# Patient Record
Sex: MALE | Race: WHITE | NOT HISPANIC OR LATINO
[De-identification: names, ages, dates, MRNs, and addresses within clinical notes are randomized per-mention and may not be internally consistent; named-entity substitution may affect disease eponyms.]

---

## 2017-03-21 ENCOUNTER — APPOINTMENT (OUTPATIENT)
Dept: CARDIOLOGY | Facility: CLINIC | Age: 47
End: 2017-03-21

## 2017-05-23 ENCOUNTER — APPOINTMENT (OUTPATIENT)
Dept: CARDIOLOGY | Facility: CLINIC | Age: 47
End: 2017-05-23

## 2017-07-18 ENCOUNTER — APPOINTMENT (OUTPATIENT)
Dept: CARDIOLOGY | Facility: CLINIC | Age: 47
End: 2017-07-18

## 2017-11-20 ENCOUNTER — RX RENEWAL (OUTPATIENT)
Age: 47
End: 2017-11-20

## 2017-12-12 ENCOUNTER — APPOINTMENT (OUTPATIENT)
Dept: CARDIOLOGY | Facility: CLINIC | Age: 47
End: 2017-12-12
Payer: COMMERCIAL

## 2017-12-12 VITALS
HEART RATE: 57 BPM | WEIGHT: 200 LBS | HEIGHT: 72 IN | OXYGEN SATURATION: 98 % | SYSTOLIC BLOOD PRESSURE: 146 MMHG | DIASTOLIC BLOOD PRESSURE: 94 MMHG | BODY MASS INDEX: 27.09 KG/M2

## 2017-12-12 DIAGNOSIS — Z82.49 FAMILY HISTORY OF ISCHEMIC HEART DISEASE AND OTHER DISEASES OF THE CIRCULATORY SYSTEM: ICD-10-CM

## 2017-12-12 DIAGNOSIS — Z80.6 FAMILY HISTORY OF LEUKEMIA: ICD-10-CM

## 2017-12-12 DIAGNOSIS — Z83.3 FAMILY HISTORY OF DIABETES MELLITUS: ICD-10-CM

## 2017-12-12 DIAGNOSIS — Z78.9 OTHER SPECIFIED HEALTH STATUS: ICD-10-CM

## 2017-12-12 PROCEDURE — 99213 OFFICE O/P EST LOW 20 MIN: CPT

## 2017-12-12 RX ORDER — LOSARTAN POTASSIUM 50 MG/1
50 TABLET, FILM COATED ORAL
Qty: 30 | Refills: 0 | Status: DISCONTINUED | COMMUNITY
Start: 2017-11-20 | End: 2017-12-12

## 2017-12-12 RX ORDER — MULTIVIT-MIN/FA/LYCOPEN/LUTEIN .4-300-25
TABLET ORAL
Refills: 0 | Status: ACTIVE | COMMUNITY

## 2018-01-23 ENCOUNTER — APPOINTMENT (OUTPATIENT)
Dept: CARDIOLOGY | Facility: CLINIC | Age: 48
End: 2018-01-23
Payer: COMMERCIAL

## 2018-01-23 VITALS
SYSTOLIC BLOOD PRESSURE: 140 MMHG | WEIGHT: 198 LBS | OXYGEN SATURATION: 96 % | HEART RATE: 59 BPM | BODY MASS INDEX: 26.82 KG/M2 | DIASTOLIC BLOOD PRESSURE: 84 MMHG | HEIGHT: 72 IN

## 2018-01-23 PROCEDURE — 99213 OFFICE O/P EST LOW 20 MIN: CPT

## 2018-03-01 ENCOUNTER — RECORD ABSTRACTING (OUTPATIENT)
Age: 48
End: 2018-03-01

## 2018-03-06 ENCOUNTER — APPOINTMENT (OUTPATIENT)
Dept: CARDIOLOGY | Facility: CLINIC | Age: 48
End: 2018-03-06
Payer: COMMERCIAL

## 2018-03-06 VITALS
BODY MASS INDEX: 26.82 KG/M2 | SYSTOLIC BLOOD PRESSURE: 130 MMHG | HEIGHT: 72 IN | HEART RATE: 62 BPM | WEIGHT: 198 LBS | DIASTOLIC BLOOD PRESSURE: 78 MMHG | OXYGEN SATURATION: 98 %

## 2018-03-06 PROCEDURE — 99214 OFFICE O/P EST MOD 30 MIN: CPT

## 2018-08-20 ENCOUNTER — MEDICATION RENEWAL (OUTPATIENT)
Age: 48
End: 2018-08-20

## 2018-08-20 ENCOUNTER — RX RENEWAL (OUTPATIENT)
Age: 48
End: 2018-08-20

## 2018-09-11 ENCOUNTER — APPOINTMENT (OUTPATIENT)
Dept: CARDIOLOGY | Facility: CLINIC | Age: 48
End: 2018-09-11
Payer: COMMERCIAL

## 2018-09-11 ENCOUNTER — NON-APPOINTMENT (OUTPATIENT)
Age: 48
End: 2018-09-11

## 2018-09-11 VITALS
DIASTOLIC BLOOD PRESSURE: 80 MMHG | HEIGHT: 72 IN | WEIGHT: 200 LBS | SYSTOLIC BLOOD PRESSURE: 130 MMHG | BODY MASS INDEX: 27.09 KG/M2 | HEART RATE: 58 BPM

## 2018-09-11 PROCEDURE — 93000 ELECTROCARDIOGRAM COMPLETE: CPT

## 2018-09-11 PROCEDURE — 99213 OFFICE O/P EST LOW 20 MIN: CPT

## 2018-09-11 RX ORDER — LOSARTAN POTASSIUM 100 MG/1
100 TABLET, FILM COATED ORAL DAILY
Qty: 30 | Refills: 11 | Status: DISCONTINUED | COMMUNITY
Start: 2017-12-12 | End: 2018-09-11

## 2019-03-19 ENCOUNTER — APPOINTMENT (OUTPATIENT)
Dept: CARDIOLOGY | Facility: CLINIC | Age: 49
End: 2019-03-19
Payer: COMMERCIAL

## 2019-03-19 VITALS
WEIGHT: 203 LBS | OXYGEN SATURATION: 97 % | HEART RATE: 60 BPM | SYSTOLIC BLOOD PRESSURE: 130 MMHG | DIASTOLIC BLOOD PRESSURE: 88 MMHG | BODY MASS INDEX: 27.53 KG/M2

## 2019-03-19 PROCEDURE — 99213 OFFICE O/P EST LOW 20 MIN: CPT

## 2019-03-19 NOTE — REASON FOR VISIT
[Follow-Up - Clinic] : a clinic follow-up of [FreeTextEntry2] : Blood pressure evaluation, follow up sleep study and labs. [FreeTextEntry1] : 49 year old active white male with family history of HTN and personal hx of HTN, YARITZA on treatment, asymptomatic sinus bradycardia.\par \par He was last seen September, 2018.  Home BP 130s/80s on Losartan 50MG bid and  chlorthalidone 25mg QD.\par \par He continues to exercise regularly with weight lifting and performing cardiovascular exercise.   He added more cardio- running 2.5miles 2 -3 x per week, lifts weights 5x week. There are no exertional symptoms. \par \par He eats very "clean", lots of lean protein, water and follows low carb diet.  He avoids salt.\par \par Sleep study 2/22/18 reveals severe sleep apnea.  CPAP titration study completed and now appropriately treated, with success. Fatigue has improved significantly.  \par \par Avoidance of ETOH, respiratory and CNS suppressant medications should be avoided.  No significant arrhythmia- PVCs.\par \par He denies any chest pain, dyspnea, palpitations, dizziness, syncope or edema.\par \par History of H/As in past- evaluated by neurologists and improved.\par \par Echocardiogram September 2016 revealed EF of 55%, normal left ventricular systolic function, there was trace valvular regurgitation, with normal aortic dimensions.\par \par Exercise tolerance testing September of 2016 was negative for ischemia.\par \par He works [employer].  [employer].  Has annual physical with JENNIFER CONNORS.\par \[facility] EKG 2-5-19 SB with 1st degree AVB.\par  \[facility] Labs:\par \par -2-19 Normal CBC, CMP with Cr. 0.96, BUN 16, 143, K 3.7, normal LFT's, total chol 174, Trgis 68, HDL 56, , \par -6/21/18 , K 3.4, , CO2 32, BUN 28, CREAT 0.93, P 4.0, CA 9.1, CHOL 187, , HDL 57, TRIGS 53, AST 42, ALT 37, HGB 13.9, HCT 41.7, WBC 5.0, .  U/A- NO protein.\par \[facility] -8/24/18: , K 3.7, CL 97, BUN21, CR 1.04, CALCIUM 9.8\par -2/13/18: , K 4.0, CL 95, CO2 32, BUN 23, CR 0.96, GFR 94, CA 9.8,\par      PT was feeling fatigued last visit- Testosterone free and total 594 ( range 264-916)\par                                                            Free testosterone 11.4 (range 6.8-21.5)\par \par

## 2019-03-19 NOTE — PHYSICAL EXAM
[Normal Appearance] : normal appearance [General Appearance - Well Developed] : well developed [General Appearance - Well Nourished] : well nourished [Well Groomed] : well groomed [No Deformities] : no deformities [Normal Conjunctiva] : the conjunctiva exhibited no abnormalities [General Appearance - In No Acute Distress] : no acute distress [Normal Oral Mucosa] : normal oral mucosa [Eyelids - No Xanthelasma] : the eyelids demonstrated no xanthelasmas [No Oral Pallor] : no oral pallor [No Oral Cyanosis] : no oral cyanosis [No Jugular Venous Arndt A Waves] : no jugular venous arndt A waves [Normal Jugular Venous V Waves Present] : normal jugular venous V waves present [Normal Jugular Venous A Waves Present] : normal jugular venous A waves present [Respiration, Rhythm And Depth] : normal respiratory rhythm and effort [Exaggerated Use Of Accessory Muscles For Inspiration] : no accessory muscle use [Auscultation Breath Sounds / Voice Sounds] : lungs were clear to auscultation bilaterally [Heart Rate And Rhythm] : heart rate and rhythm were normal [Heart Sounds] : normal S1 and S2 [Murmurs] : no murmurs present [Abdomen Soft] : soft [Abdomen Tenderness] : non-tender [Abnormal Walk] : normal gait [Abdomen Mass (___ Cm)] : no abdominal mass palpated [Gait - Sufficient For Exercise Testing] : the gait was sufficient for exercise testing [Nail Clubbing] : no clubbing of the fingernails [Petechial Hemorrhages (___cm)] : no petechial hemorrhages [Cyanosis, Localized] : no localized cyanosis [] : no rash [Skin Color & Pigmentation] : normal skin color and pigmentation [No Venous Stasis] : no venous stasis [Skin Lesions] : no skin lesions [No Skin Ulcers] : no skin ulcer [No Xanthoma] : no  xanthoma was observed [Oriented To Time, Place, And Person] : oriented to person, place, and time [No Anxiety] : not feeling anxious [Mood] : the mood was normal [Affect] : the affect was normal

## 2019-03-19 NOTE — ASSESSMENT
[FreeTextEntry1] : 49 year old very active male with past medical hx of mild dyslipidemia, well controlled with lifestyle change and HTN, presently asymptomatic.\par \par Remain on  Losartan 50mg po bid and chlorthalildone 25mg po qd.  No change.  \par \par - Prior cardiac testing were negative. \par \par -Severe YARITZA CPAP titration compliant.\par \par -Continue to Monitor home BPs\par -Continue with healthy active lifestyle. \par \par He should follow up in 1 year or sooner if needed.  Follow up with PMD for on going medical care.

## 2019-07-23 ENCOUNTER — MEDICATION RENEWAL (OUTPATIENT)
Age: 49
End: 2019-07-23

## 2019-07-23 ENCOUNTER — APPOINTMENT (OUTPATIENT)
Dept: CARDIOLOGY | Facility: CLINIC | Age: 49
End: 2019-07-23
Payer: COMMERCIAL

## 2019-07-23 VITALS
WEIGHT: 200 LBS | SYSTOLIC BLOOD PRESSURE: 140 MMHG | DIASTOLIC BLOOD PRESSURE: 80 MMHG | HEART RATE: 59 BPM | OXYGEN SATURATION: 98 % | HEIGHT: 72 IN | BODY MASS INDEX: 27.09 KG/M2

## 2019-07-23 DIAGNOSIS — Z86.39 PERSONAL HISTORY OF OTHER ENDOCRINE, NUTRITIONAL AND METABOLIC DISEASE: ICD-10-CM

## 2019-07-23 PROCEDURE — 99214 OFFICE O/P EST MOD 30 MIN: CPT

## 2019-07-23 NOTE — ASSESSMENT
[FreeTextEntry1] : 49 year old very active male with past medical hx of mild dyslipidemia, well controlled with lifestyle change and HTN, presently asymptomatic.\par \par Remains on  Losartan 50mg po bid and chlorthalildone 25mg po qd. Sometimes his blood pressures are above 140. At his job, we want it to be lower than 140 systolic. Add low-dose Norvasc.\par \par - Prior cardiac testing was negative. Followup echocardiogram in future. He will call for results.\par \par -Severe YARITZA CPAP titration compliant.\par \par -Continue to Monitor home BPs\par \par -Continue with healthy active lifestyle. \par \par The patient was not taking potassium supplements. It has been prescribed. He is on diuretic and requires oral potassium tablet.\par \par Abnormal LFT. He declines alcohol abuse. He takes alcohol on few occasions in a month. I have asked him to follow his abnormal LFTs with Dr. Pfeiffer. He is asymptomatic.\par \par \par Sincerely,\par Hayden Stiles MD, FACC, MIMI

## 2019-07-23 NOTE — PHYSICAL EXAM
[General Appearance - Well Developed] : well developed [Normal Appearance] : normal appearance [General Appearance - Well Nourished] : well nourished [Well Groomed] : well groomed [No Deformities] : no deformities [Normal Conjunctiva] : the conjunctiva exhibited no abnormalities [General Appearance - In No Acute Distress] : no acute distress [Eyelids - No Xanthelasma] : the eyelids demonstrated no xanthelasmas [Normal Oral Mucosa] : normal oral mucosa [No Oral Pallor] : no oral pallor [No Oral Cyanosis] : no oral cyanosis [Normal Jugular Venous A Waves Present] : normal jugular venous A waves present [Normal Jugular Venous V Waves Present] : normal jugular venous V waves present [No Jugular Venous Arndt A Waves] : no jugular venous arndt A waves [Respiration, Rhythm And Depth] : normal respiratory rhythm and effort [Exaggerated Use Of Accessory Muscles For Inspiration] : no accessory muscle use [Auscultation Breath Sounds / Voice Sounds] : lungs were clear to auscultation bilaterally [Heart Rate And Rhythm] : heart rate and rhythm were normal [Heart Sounds] : normal S1 and S2 [Murmurs] : no murmurs present [Abdomen Soft] : soft [Abdomen Tenderness] : non-tender [Abdomen Mass (___ Cm)] : no abdominal mass palpated [Gait - Sufficient For Exercise Testing] : the gait was sufficient for exercise testing [Abnormal Walk] : normal gait [Nail Clubbing] : no clubbing of the fingernails [Petechial Hemorrhages (___cm)] : no petechial hemorrhages [Cyanosis, Localized] : no localized cyanosis [] : no ischemic changes [Skin Color & Pigmentation] : normal skin color and pigmentation [Skin Lesions] : no skin lesions [No Venous Stasis] : no venous stasis [No Skin Ulcers] : no skin ulcer [No Xanthoma] : no  xanthoma was observed [Oriented To Time, Place, And Person] : oriented to person, place, and time [Mood] : the mood was normal [Affect] : the affect was normal [No Anxiety] : not feeling anxious

## 2019-07-23 NOTE — REASON FOR VISIT
[Follow-Up - Clinic] : a clinic follow-up of [FreeTextEntry2] : Blood pressure evaluation, follow up sleep study and labs. [FreeTextEntry1] : 49 year old active white male with family history of HTN and personal hx of HTN, YARITZA on treatment, asymptomatic sinus bradycardia.\par \par Home BP 140s/80s on Losartan 50MG bid and  chlorthalidone 25mg QD.\par \par He continues to exercise regularly with weight lifting and performing cardiovascular exercise.   He added more cardio- running 2.5miles 2 -3 x per week, lifts weights 5x week. There are no exertional symptoms. \par \par He eats very "clean", lots of lean protein, water and follows low carb diet.  He avoids salt.\par \par Sleep study 2/22/18 reveals severe sleep apnea.  CPAP titration study completed and now appropriately treated, with success. Fatigue has improved significantly.  \par \par Avoidance of ETOH, respiratory and CNS suppressant medications should be avoided.  No significant arrhythmia- PVCs.\par \par He denies any chest pain, dyspnea, palpitations, dizziness, syncope or edema.\par \par History of H/As in past- evaluated by neurologists and improved.\par \par Echocardiogram September 2016 revealed EF of 55%, normal left ventricular systolic function, there was trace valvular regurgitation, with normal aortic dimensions.\par \par Exercise tolerance testing September of 2016 was negative for ischemia.\par \par He works [employer].  [employer].  Has annual physical with FRony CONNORS.\par \par EKG 2-5-19 SB with 1st degree AVB.\par  \par Labs:\par \par -2-19 Normal CBC, CMP with Cr. 0.96, BUN 16, 143, K 3.7, normal LFT's, total chol 174, Trgis 68, HDL 56, , \par -6/21/18 , K 3.4, , CO2 32, BUN 28, CREAT 0.93, P 4.0, CA 9.1, CHOL 187, , HDL 57, TRIGS 53, AST 42, ALT 37, HGB 13.9, HCT 41.7, WBC 5.0, .  U/A- NO protein.\par \par -

## 2019-10-30 ENCOUNTER — APPOINTMENT (OUTPATIENT)
Dept: CARDIOLOGY | Facility: CLINIC | Age: 49
End: 2019-10-30
Payer: COMMERCIAL

## 2019-10-30 PROCEDURE — 93306 TTE W/DOPPLER COMPLETE: CPT

## 2019-11-05 ENCOUNTER — APPOINTMENT (OUTPATIENT)
Dept: CARDIOLOGY | Facility: CLINIC | Age: 49
End: 2019-11-05

## 2019-11-20 ENCOUNTER — MEDICATION RENEWAL (OUTPATIENT)
Age: 49
End: 2019-11-20

## 2020-02-04 ENCOUNTER — APPOINTMENT (OUTPATIENT)
Dept: CARDIOLOGY | Facility: CLINIC | Age: 50
End: 2020-02-04
Payer: COMMERCIAL

## 2020-02-04 VITALS
HEIGHT: 72 IN | HEART RATE: 71 BPM | DIASTOLIC BLOOD PRESSURE: 84 MMHG | SYSTOLIC BLOOD PRESSURE: 128 MMHG | OXYGEN SATURATION: 95 % | WEIGHT: 206 LBS | BODY MASS INDEX: 27.9 KG/M2

## 2020-02-04 PROCEDURE — 99214 OFFICE O/P EST MOD 30 MIN: CPT

## 2020-02-04 RX ORDER — MAGNESIUM OXIDE 500 MG
500 TABLET ORAL
Refills: 0 | Status: DISCONTINUED | COMMUNITY
End: 2020-02-04

## 2020-02-04 NOTE — ASSESSMENT
[FreeTextEntry1] : Reviewed today:\par -Echocardiogram September 2016 revealed EF of 55%, normal left ventricular systolic function, there was trace valvular regurgitation, with normal aortic dimensions.\par \par Exercise tolerance testing September of 2016 was negative for ischemia.\par \par EKG 2-5-19 SB with 1st degree AVB.\par  \par Labs: 2-19 Normal CBC, CMP with Cr. 0.96, BUN 16, 143, K 3.7, normal LFT's, total chol 174, Trgis 68, HDL 56, \par \par Labs June 2019: , potassium 3.3, AST 55, ALT 65\par \par \par

## 2020-02-04 NOTE — REASON FOR VISIT
[Follow-Up - Clinic] : a clinic follow-up of [FreeTextEntry2] : Blood pressure evaluation, follow up sleep study and labs. [FreeTextEntry1] : 49 year old white male with family history of HTN and personal hx of HTN, YARITZA on treatment, asymptomatic sinus bradycardia.\par \par Home /80s on Losartan 50MG bid and  chlorthalidone 25mg QD and Norvasc\par \par He continues to exercise regularly with weight lifting and performing cardiovascular exercise.   He added more cardio- running 2.5miles 2 -3 x per week, lifts weights 5x week. There are no exertional symptoms. \par \par He eats very "clean", lots of lean protein, water and follows low carb diet.  He avoids salt.\par \par Sleep study 2/22/18 reveals severe sleep apnea.  CPAP titration study completed and now appropriately treated, with success. Fatigue has improved significantly.  \par \par He denies any chest pain, dyspnea, palpitations, dizziness, syncope or edema. History of H/As in past- evaluated by neurologists and improved.\par \par He works [employer].  [employer].  Has annual physical with FRony CONNORS.\par \par

## 2020-02-04 NOTE — DISCUSSION/SUMMARY
[FreeTextEntry1] : \par 49 year old very active male with past medical hx of mild dyslipidemia, well controlled with lifestyle change and HTN, presently asymptomatic.\par \par Remains on  Losartan 50mg po bid and chlorthalildone 25mg po qd and low-dose Norvasc.  Blood pressures are well controlled.  His home blood pressure monitor is fairly close to office readings\par \par Severe YARITZA -on CPAP mask treatment with good results.\par \par Continue to Monitor home BPs\par \par Continue with healthy active lifestyle.  ETT in future.  On medications to gauge blood pressure response and possible arrhythmias.  Echocardiogram from October 2019 was discussed.  No LVH and normal left atrium and normal diastolic function\par \par Abnormal LFT. He declines alcohol abuse.  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  Currently, he does not take potassium or magnesium tablet but takes them through supplements from [facility].  He understands the needs for the supplements on diuretics.  He does not have any cramps or symptoms of electrolyte disturbance\par \par \par Sincerely,\par Hayden Stiles MD, FACC, MIMI

## 2020-02-04 NOTE — PHYSICAL EXAM
[Normal Appearance] : normal appearance [General Appearance - Well Developed] : well developed [Well Groomed] : well groomed [General Appearance - In No Acute Distress] : no acute distress [General Appearance - Well Nourished] : well nourished [No Deformities] : no deformities [Normal Conjunctiva] : the conjunctiva exhibited no abnormalities [Eyelids - No Xanthelasma] : the eyelids demonstrated no xanthelasmas [No Oral Pallor] : no oral pallor [No Oral Cyanosis] : no oral cyanosis [Normal Oral Mucosa] : normal oral mucosa [Normal Jugular Venous A Waves Present] : normal jugular venous A waves present [No Jugular Venous Arndt A Waves] : no jugular venous arndt A waves [Normal Jugular Venous V Waves Present] : normal jugular venous V waves present [Respiration, Rhythm And Depth] : normal respiratory rhythm and effort [Exaggerated Use Of Accessory Muscles For Inspiration] : no accessory muscle use [Heart Sounds] : normal S1 and S2 [Auscultation Breath Sounds / Voice Sounds] : lungs were clear to auscultation bilaterally [Heart Rate And Rhythm] : heart rate and rhythm were normal [Murmurs] : no murmurs present [Abdomen Soft] : soft [Abdomen Mass (___ Cm)] : no abdominal mass palpated [Abdomen Tenderness] : non-tender [Abnormal Walk] : normal gait [Nail Clubbing] : no clubbing of the fingernails [Gait - Sufficient For Exercise Testing] : the gait was sufficient for exercise testing [Skin Color & Pigmentation] : normal skin color and pigmentation [Petechial Hemorrhages (___cm)] : no petechial hemorrhages [Cyanosis, Localized] : no localized cyanosis [No Venous Stasis] : no venous stasis [] : no rash [Skin Lesions] : no skin lesions [No Skin Ulcers] : no skin ulcer [No Xanthoma] : no  xanthoma was observed [Oriented To Time, Place, And Person] : oriented to person, place, and time [Affect] : the affect was normal [Mood] : the mood was normal [No Anxiety] : not feeling anxious

## 2020-10-21 ENCOUNTER — APPOINTMENT (OUTPATIENT)
Dept: CARDIOLOGY | Facility: CLINIC | Age: 50
End: 2020-10-21
Payer: COMMERCIAL

## 2020-10-21 PROCEDURE — 93015 CV STRESS TEST SUPVJ I&R: CPT

## 2020-10-27 ENCOUNTER — APPOINTMENT (OUTPATIENT)
Dept: CARDIOLOGY | Facility: CLINIC | Age: 50
End: 2020-10-27
Payer: COMMERCIAL

## 2020-10-27 VITALS
WEIGHT: 193 LBS | OXYGEN SATURATION: 98 % | DIASTOLIC BLOOD PRESSURE: 86 MMHG | HEIGHT: 72 IN | HEART RATE: 59 BPM | TEMPERATURE: 98.1 F | SYSTOLIC BLOOD PRESSURE: 130 MMHG | BODY MASS INDEX: 26.14 KG/M2

## 2020-10-27 PROCEDURE — 99072 ADDL SUPL MATRL&STAF TM PHE: CPT

## 2020-10-27 PROCEDURE — 99214 OFFICE O/P EST MOD 30 MIN: CPT

## 2020-10-27 NOTE — ASSESSMENT
[FreeTextEntry1] : Reviewed today:\par -Echocardiogram September 2016 revealed EF of 55%, normal left ventricular systolic function, there was trace valvular regurgitation, with normal aortic dimensions.\par \par Exercise tolerance testing September of 2016 was negative for ischemia.\par \par EKG 2-5-19 SB with 1st degree AVB.\par  \par Labs: 2-19 Normal CBC, CMP with Cr. 0.96, BUN 16, 143, K 3.7, normal LFT's, total chol 174, Trgis 68, HDL 56, \par \par Labs June 2019: , potassium 3.3, AST 55, ALT 65\par \par Labs April 2020: Creatinine 1.16.  Potassium 3.8.\par \par ETT October 2020: Exercise 15: 44 minutes on James protocol.  Peak blood pressure 160/100.  Occasional PVCs.  No ischemia or angina.\par \par \par

## 2020-10-27 NOTE — DISCUSSION/SUMMARY
[FreeTextEntry1] : \par 50 year old very active male with past medical hx of mild dyslipidemia, well controlled with lifestyle change and HTN, presently asymptomatic.\par \par Remains on  Losartan 50mg po bid and chlorthalildone 25mg po qd and low-dose Norvasc.  Blood pressures are well controlled.  His home blood pressure monitor is fairly close to office readings\par \par Severe YARITZA -on CPAP mask treatment with good results.\par \par Continue to Monitor home BPs\par \par Continue with healthy active lifestyle.  Echocardiogram from October 2019 was discussed.  No LVH and normal left atrium and normal diastolic function\par \par Currently, he does not take potassium or magnesium tablet but takes them through supplements from [facility].  He understands the needs for the supplements on diuretics.  He does not have any cramps or symptoms of electrolyte disturbance\par \par History of abnormal LFT. He declines alcohol abuse.  LFTs had come down on labs in September 2019\par \par \par Sincerely,\par Hayden Stiles MD, FACC, MIMI

## 2020-10-27 NOTE — REASON FOR VISIT
[Follow-Up - Clinic] : a clinic follow-up of [FreeTextEntry2] : Blood pressure evaluation, follow up sleep study and labs. [FreeTextEntry1] : 50 year old white male with family history of HTN and personal hx of HTN, YARITZA on treatment, asymptomatic sinus bradycardia.\par \par Home /80s on Losartan 50MG bid and  chlorthalidone 25mg QD and Norvasc\par \par He continues to exercise regularly with weight lifting and performing cardiovascular exercise.   There are no exertional symptoms. \par \par He eats very "clean", lots of lean protein, water and follows low carb diet.  He avoids salt.\par \par Sleep study 2/22/18 reveals severe sleep apnea.  CPAP titration study completed and now appropriately treated, with success. Fatigue has improved significantly.  \par \par He denies any chest pain, dyspnea, palpitations, dizziness, syncope or edema. History of H/As in past- evaluated by neurologists and improved.\par \par He works [employer].  [employer].  Has annual physical with FRony CONNORS.\par \par On ETT in October 2020, he exercised more than 15 minutes without any evidence of ischemia or angina.  He had few PVCs.

## 2020-10-27 NOTE — PHYSICAL EXAM
[General Appearance - Well Developed] : well developed [Normal Appearance] : normal appearance [Well Groomed] : well groomed [General Appearance - Well Nourished] : well nourished [No Deformities] : no deformities [General Appearance - In No Acute Distress] : no acute distress [Normal Conjunctiva] : the conjunctiva exhibited no abnormalities [Eyelids - No Xanthelasma] : the eyelids demonstrated no xanthelasmas [Normal Oral Mucosa] : normal oral mucosa [No Oral Pallor] : no oral pallor [No Oral Cyanosis] : no oral cyanosis [Normal Jugular Venous A Waves Present] : normal jugular venous A waves present [Normal Jugular Venous V Waves Present] : normal jugular venous V waves present [No Jugular Venous Arndt A Waves] : no jugular venous arndt A waves [Respiration, Rhythm And Depth] : normal respiratory rhythm and effort [Exaggerated Use Of Accessory Muscles For Inspiration] : no accessory muscle use [Auscultation Breath Sounds / Voice Sounds] : lungs were clear to auscultation bilaterally [Heart Rate And Rhythm] : heart rate and rhythm were normal [Heart Sounds] : normal S1 and S2 [Murmurs] : no murmurs present [Abdomen Soft] : soft [Abdomen Tenderness] : non-tender [Abdomen Mass (___ Cm)] : no abdominal mass palpated [Abnormal Walk] : normal gait [Gait - Sufficient For Exercise Testing] : the gait was sufficient for exercise testing [Nail Clubbing] : no clubbing of the fingernails [Cyanosis, Localized] : no localized cyanosis [Petechial Hemorrhages (___cm)] : no petechial hemorrhages [Skin Color & Pigmentation] : normal skin color and pigmentation [] : no rash [No Venous Stasis] : no venous stasis [Skin Lesions] : no skin lesions [No Skin Ulcers] : no skin ulcer [No Xanthoma] : no  xanthoma was observed [Oriented To Time, Place, And Person] : oriented to person, place, and time [Affect] : the affect was normal [Mood] : the mood was normal [No Anxiety] : not feeling anxious

## 2021-04-06 ENCOUNTER — RX RENEWAL (OUTPATIENT)
Age: 51
End: 2021-04-06

## 2021-11-08 ENCOUNTER — RESULT CHARGE (OUTPATIENT)
Age: 51
End: 2021-11-08

## 2021-11-09 ENCOUNTER — APPOINTMENT (OUTPATIENT)
Dept: CARDIOLOGY | Facility: CLINIC | Age: 51
End: 2021-11-09
Payer: COMMERCIAL

## 2021-11-09 ENCOUNTER — NON-APPOINTMENT (OUTPATIENT)
Age: 51
End: 2021-11-09

## 2021-11-09 VITALS
HEIGHT: 72 IN | DIASTOLIC BLOOD PRESSURE: 84 MMHG | WEIGHT: 200 LBS | BODY MASS INDEX: 27.09 KG/M2 | HEART RATE: 57 BPM | SYSTOLIC BLOOD PRESSURE: 134 MMHG | TEMPERATURE: 97.1 F

## 2021-11-09 PROCEDURE — 99214 OFFICE O/P EST MOD 30 MIN: CPT

## 2021-11-09 RX ORDER — CAPSAICIN 0.1 %
CREAM (GRAM) TOPICAL
Refills: 0 | Status: DISCONTINUED | COMMUNITY
End: 2021-11-09

## 2021-11-09 NOTE — PHYSICAL EXAM
[General Appearance - Well Developed] : well developed [Normal Appearance] : normal appearance [Well Groomed] : well groomed [General Appearance - Well Nourished] : well nourished [No Deformities] : no deformities [General Appearance - In No Acute Distress] : no acute distress [Normal Conjunctiva] : the conjunctiva exhibited no abnormalities [Eyelids - No Xanthelasma] : the eyelids demonstrated no xanthelasmas [Normal Oral Mucosa] : normal oral mucosa [No Oral Pallor] : no oral pallor [No Oral Cyanosis] : no oral cyanosis [Normal Jugular Venous A Waves Present] : normal jugular venous A waves present [Normal Jugular Venous V Waves Present] : normal jugular venous V waves present [No Jugular Venous Arndt A Waves] : no jugular venous arndt A waves [Respiration, Rhythm And Depth] : normal respiratory rhythm and effort [Exaggerated Use Of Accessory Muscles For Inspiration] : no accessory muscle use [Auscultation Breath Sounds / Voice Sounds] : lungs were clear to auscultation bilaterally [Heart Rate And Rhythm] : heart rate and rhythm were normal [Heart Sounds] : normal S1 and S2 [Murmurs] : no murmurs present [Abdomen Soft] : soft [Abdomen Tenderness] : non-tender [Abdomen Mass (___ Cm)] : no abdominal mass palpated [Abnormal Walk] : normal gait [Gait - Sufficient For Exercise Testing] : the gait was sufficient for exercise testing [Nail Clubbing] : no clubbing of the fingernails [Petechial Hemorrhages (___cm)] : no petechial hemorrhages [Cyanosis, Localized] : no localized cyanosis [Skin Color & Pigmentation] : normal skin color and pigmentation [] : no rash [No Venous Stasis] : no venous stasis [Skin Lesions] : no skin lesions [No Skin Ulcers] : no skin ulcer [No Xanthoma] : no  xanthoma was observed [Oriented To Time, Place, And Person] : oriented to person, place, and time [Affect] : the affect was normal [Mood] : the mood was normal [No Anxiety] : not feeling anxious

## 2021-11-09 NOTE — ASSESSMENT
[FreeTextEntry1] : Reviewed today:\par -Echocardiogram September 2016 revealed EF of 55%, normal left ventricular systolic function, there was trace valvular regurgitation, with normal aortic dimensions.\par \par Exercise tolerance testing September of 2016 was negative for ischemia.\par \par EKG 2-5-19 SB with 1st degree AVB.\par  \par Labs: 2-19 Normal CBC, CMP with Cr. 0.96, BUN 16, 143, K 3.7, normal LFT's, total chol 174, Trgis 68, HDL 56, \par \par Labs June 2019: , potassium 3.3, AST 55, ALT 65\par \par Labs April 2020: Creatinine 1.16.  Potassium 3.8.\par \par ETT October 2020: Exercise 15: 44 minutes on James protocol.  Peak blood pressure 160/100.  Occasional PVCs.  No ischemia or angina.\par \par Labs April 2021:  Normal hemoglobin and CBC.  FBS 78, AST and ALT 64\par \par \par

## 2021-11-09 NOTE — DISCUSSION/SUMMARY
[FreeTextEntry1] : \par 51 year old very active male with past medical hx of mild dyslipidemia, well controlled with lifestyle change.  Also, HTN - presently asymptomatic.\par \par Remains on  Losartan 50mg po bid and chlorthalildone 25mg po qd and low-dose Norvasc.  Blood pressures are well controlled.  His home blood pressure monitor is fairly close to office readings\par \par Severe YARITZA -on CPAP mask treatment with good results.\par \par Continue to Monitor home BPs\par \par Continue with healthy active lifestyle.  Echocardiogram from October 2019 was discussed.  No LVH and normal left atrium and normal diastolic function\par \par Currently, he does not take potassium or magnesium tablet but takes them through supplements from [facility].  He understands the needs for the supplements on diuretics.  He does not have any cramps or symptoms of electrolyte disturbance\par \par History of abnormal LFT. He declines alcohol abuse.  LFTs had come down on labs in September 2019 but they were again elevated in April 2021 with AST 64 and ALT 64.  He is now scheduled to see GI for consultation.\par \par Echo should be repeated in future looking for hypertensive changes.\par \par \par Sincerely,\par Hayden Stiles MD, FACC, MIMI

## 2021-11-09 NOTE — REASON FOR VISIT
[Follow-Up - Clinic] : a clinic follow-up of [FreeTextEntry1] : 51 year old white male with family history of HTN and personal hx of HTN, YARITZA on treatment, asymptomatic sinus bradycardia.\par \par Hypertension: Controlled on Losartan 50MG bid and  chlorthalidone 25mg QD and Norvasc\par \par He continues to exercise regularly with weight lifting and performing cardiovascular exercise.   There are no exertional symptoms. \par \par He eats very "clean", lots of lean protein, water and follows low carb diet.  He avoids salt.\par \par Sleep study 2/22/18 reveals severe sleep apnea.  CPAP titration study completed and now appropriately treated, with success. Fatigue has improved significantly.  \par \par He denies any chest pain, dyspnea, palpitations, dizziness, syncope or edema. History of H/As in past- evaluated by neurologists and improved.\par \par He works [employer].  [employer].  Has annual physical with FRony CONNORS.\par \par On ETT in October 2020, he exercised more than 15 minutes without any evidence of ischemia or angina.  He had few PVCs. [FreeTextEntry2] : Blood pressure evaluation, follow up sleep study and labs.

## 2022-08-12 ENCOUNTER — APPOINTMENT (OUTPATIENT)
Dept: CARDIOLOGY | Facility: CLINIC | Age: 52
End: 2022-08-12

## 2022-08-12 VITALS
HEIGHT: 72 IN | DIASTOLIC BLOOD PRESSURE: 90 MMHG | TEMPERATURE: 97.1 F | BODY MASS INDEX: 27.77 KG/M2 | SYSTOLIC BLOOD PRESSURE: 146 MMHG | HEART RATE: 66 BPM | WEIGHT: 205 LBS | OXYGEN SATURATION: 98 %

## 2022-08-12 VITALS — DIASTOLIC BLOOD PRESSURE: 80 MMHG | SYSTOLIC BLOOD PRESSURE: 140 MMHG

## 2022-08-12 PROCEDURE — 99214 OFFICE O/P EST MOD 30 MIN: CPT

## 2022-08-12 NOTE — REASON FOR VISIT
[Follow-Up - Clinic] : a clinic follow-up of [FreeTextEntry1] : 52 year old white male with family history of HTN and personal hx of HTN, YARITZA on treatment, asymptomatic sinus bradycardia.\par \par Last visit was 11/2021. Today he presents for a routine follow up. He denies chest pain, pressure, palpitations, unusual shortness of breath, GARCIA, orthopnea, LE edema, lightheadedness, dizziness, near syncope or syncope. \par Hypertension:  on Losartan 50MG bid and  chlorthalidone 25mg QD and Norvasc. Slightly elevated today at 140/80. Pt just had a physical at work and stated his BP was 120/60. He had coffee prior to OV.\par \par He continues to exercise regularly with weight lifting and performing cardiovascular exercise.   There are no exertional symptoms. \par \par He eats very "clean", lots of lean protein, water and follows low carb diet.  He avoids salt.\par \par Sleep study 2/22/18 reveals severe sleep apnea.  CPAP titration study completed and now appropriately treated, with success. Fatigue has improved significantly. \par \par He works [employer].  [employer].  Has annual physical with FRony CONNORS.\par \[facility] On ETT in October 2020, he exercised more than 15 minutes without any evidence of ischemia or angina.  He had few PVCs which his is asymptomatic for. He recently had a stress test with work. I asked that he has then faxed to us.  [FreeTextEntry2] : Blood pressure evaluation, follow up sleep study and labs.

## 2022-08-12 NOTE — DISCUSSION/SUMMARY
[FreeTextEntry1] : 52 year old very active male with past medical hx of mild dyslipidemia, well controlled with lifestyle change.  Also, HTN - presently asymptomatic.\par \par Remains on  Losartan 50mg po bid and chlorthalildone 25mg po qd and low-dose Norvasc.  Blood pressures slightly eleavted in office today. \par He will monitor his blood pressure over the next 2 weeks and if constantly elevated greater than 140 systolic he will call the office to let us know.  At that point would likely increase his Norvasc to 5 mg if needed.  If readings are less than 140 systolic he will not call office.\par -Recent physical blood pressure 120/60 as per the patient\par \par Severe YARITZA -on CPAP mask treatment with good results.\par \par Continue to Monitor home BPs\par \par Continue with healthy active lifestyle.  Echocardiogram from October 2019 was discussed.  No LVH and normal left atrium and normal diastolic function\par \par History of abnormal LFT. He declines alcohol abuse.  LFTs had come down on labs in September 2019 but they were again elevated in April 2021 with AST 64 and ALT 64.  \par - AST 46 ALT 35 from August 2022\par \par Echo should be repeated in future looking for hypertensive changes.  We will schedule this prior to his next office visit\par \par Sincerely,\par \par Louisa Riddle ANP-C\par Patients history, testing, and plan reviewed with supervising MD: Dr. Dariel David

## 2022-08-12 NOTE — ASSESSMENT
[FreeTextEntry1] : Reviewed Testing Today:\par Labs 8/3/2022: Hemoglobin 15.2 hematocrit 45.1 creatinine 1.01 potassium 4.1 AST 46 ALT 35 total cholesterol 205 HDL 66 \par \par Previous Testing:\par -Echocardiogram September 2016 revealed EF of 55%, normal left ventricular systolic function, there was trace valvular regurgitation, with normal aortic dimensions.\par \par Exercise tolerance testing September of 2016 was negative for ischemia.\par \par EKG 2-5-19 SB with 1st degree AVB.\par  \par Labs: 2-19 Normal CBC, CMP with Cr. 0.96, BUN 16, 143, K 3.7, normal LFT's, total chol 174, Trgis 68, HDL 56, \par \par Labs June 2019: , potassium 3.3, AST 55, ALT 65\par \par Labs April 2020: Creatinine 1.16.  Potassium 3.8.\par \par ETT October 2020: Exercise 15: 44 minutes on James protocol.  Peak blood pressure 160/100.  Occasional PVCs.  No ischemia or angina.\par \par Labs April 2021:  Normal hemoglobin and CBC.  FBS 78, AST and ALT 64\par \par \par

## 2022-12-20 ENCOUNTER — APPOINTMENT (OUTPATIENT)
Dept: CARDIOLOGY | Facility: CLINIC | Age: 52
End: 2022-12-20
Payer: COMMERCIAL

## 2022-12-20 VITALS
TEMPERATURE: 97.1 F | DIASTOLIC BLOOD PRESSURE: 82 MMHG | HEIGHT: 72 IN | SYSTOLIC BLOOD PRESSURE: 132 MMHG | WEIGHT: 209 LBS | BODY MASS INDEX: 28.31 KG/M2 | HEART RATE: 68 BPM | OXYGEN SATURATION: 96 %

## 2022-12-20 PROCEDURE — 93000 ELECTROCARDIOGRAM COMPLETE: CPT

## 2022-12-20 PROCEDURE — 99215 OFFICE O/P EST HI 40 MIN: CPT | Mod: 25

## 2022-12-20 PROCEDURE — 93242 EXT ECG>48HR<7D RECORDING: CPT

## 2022-12-20 RX ORDER — CYANOCOBALAMIN (VITAMIN B-12) 1000 MCG
TABLET ORAL
Refills: 0 | Status: COMPLETED | COMMUNITY
End: 2022-12-20

## 2022-12-20 RX ORDER — UBIDECARENONE/VIT E ACET 100MG-5
1000 CAPSULE ORAL
Refills: 0 | Status: COMPLETED | COMMUNITY
End: 2022-12-20

## 2022-12-20 RX ORDER — ACETAMINOPHEN 500 MG
1000 TABLET ORAL
Refills: 0 | Status: COMPLETED | COMMUNITY
End: 2022-12-20

## 2022-12-20 NOTE — REASON FOR VISIT
[FreeTextEntry1] : 52 year old white male with family history of HTN and personal hx of HTN, YARITZA on treatment, asymptomatic sinus bradycardia.\par \par He denies chest pain, pressure, palpitations, unusual shortness of breath, GARCIA, orthopnea, LE edema, lightheadedness, dizziness, near syncope or syncope. \par \par Hypertension:  on Losartan 50MG bid and  chlorthalidone 25mg QD and Norvasc 2.5 mg.  At home, slightly elevated today.  The office, it was 132/82 mmHg.  EKG showed PVCs, asymptomatic.\par \par He continues to exercise regularly with weight lifting and performing cardiovascular exercise.   There are no exertional symptoms. \par \par He eats very "clean", lots of lean protein, water and follows low carb diet.  He avoids salt.\par \par Sleep study 2/22/18 reveals severe sleep apnea.  CPAP titration study completed and now appropriately treated, with success. Fatigue has improved significantly. \par \par He works [employer].  [employer].  Has annual physical with FRony CONNORS.\par \par On ETT in October 2020, he exercised more than 15 minutes without any evidence of ischemia or angina.  He had few PVCs which his is asymptomatic for. He recently had a stress test with work. I asked that he has then faxed to us.  [Follow-Up - Clinic] : a clinic follow-up of [FreeTextEntry2] : Blood pressure evaluation, follow up sleep study and labs.

## 2022-12-20 NOTE — DISCUSSION/SUMMARY
[FreeTextEntry1] : 52 year old very active male with past medical hx of mild dyslipidemia, well controlled with lifestyle change.  Also, HTN - presently asymptomatic.\par \par Remains on  Losartan 50mg po bid and chlorthalildone 25mg po qd and low-dose Norvasc.  Blood pressures adequate in the office but slightly elevated at home.  Home digital machine may be giving him falsely high readings?\par \par Severe YARITZA -on CPAP mask treatment with good results.\par \par Continue to Monitor home BPs/will need to check his blood pressure machine and correlate\par \par Continue with healthy active lifestyle.  Echocardiogram from October 2019 was discussed.  No LVH and normal left atrium and normal diastolic function\par \par History of abnormal LFT. He declines alcohol abuse.  LFTs had come down on labs in September 2019 but they were again elevated in April 2021 with AST 64 and ALT 64.  AST 46 and ALT 35 from August 2022\par \par Check electrolytes.  Also TSH.  He is on a diuretic and may explain PVCs\par \par Check ZIO recording for 3 days.  Echocardiogram\par \par OV after\par \par Thank you for this referral and allowing me to participate in the care of this patient.  If I can be of any further help or  if you have any questions, please do not hesitate to contact me\par \par \par Sincerely,\par \par Hayden Stiles MD, FACC, MIMI

## 2022-12-29 ENCOUNTER — APPOINTMENT (OUTPATIENT)
Dept: CARDIOLOGY | Facility: CLINIC | Age: 52
End: 2022-12-29
Payer: COMMERCIAL

## 2022-12-29 PROCEDURE — 93244 EXT ECG>48HR<7D REV&INTERPJ: CPT

## 2023-01-06 ENCOUNTER — APPOINTMENT (OUTPATIENT)
Dept: CARDIOLOGY | Facility: CLINIC | Age: 53
End: 2023-01-06
Payer: COMMERCIAL

## 2023-01-06 VITALS
SYSTOLIC BLOOD PRESSURE: 130 MMHG | BODY MASS INDEX: 28.44 KG/M2 | HEART RATE: 60 BPM | TEMPERATURE: 97.7 F | DIASTOLIC BLOOD PRESSURE: 80 MMHG | HEIGHT: 72 IN | OXYGEN SATURATION: 96 % | WEIGHT: 210 LBS

## 2023-01-06 PROCEDURE — 99214 OFFICE O/P EST MOD 30 MIN: CPT

## 2023-01-22 NOTE — REASON FOR VISIT
[Follow-Up - Clinic] : a clinic follow-up of [FreeTextEntry1] : 52 year old white male with family history of HTN and personal hx of HTN, YARITZA on treatment, asymptomatic sinus bradycardia.\par \par He denies chest pain, pressure,  unusual shortness of breath, GARCIA, orthopnea, LE edema, lightheadedness, dizziness, near syncope or syncope. He complains of intermittent rare palpitations. Today he presents to review his recent testing.\par \par Hypertension:  on Losartan 50MG bid and  chlorthalidone 25mg QD and Norvasc 2.5 mg. \par \par He continues to exercise regularly with weight lifting and performing cardiovascular exercise.   There are no exertional symptoms. \par \par He eats very "clean", lots of lean protein, water and follows low carb diet.  He avoids salt.\par \par Sleep study 2/22/18 reveals severe sleep apnea.  CPAP titration study completed and now appropriately treated, with success. Fatigue has improved significantly. \par \par He works [employer].  [employer].  Has annual physical with F. D.\par \par On ETT in October 2020, he exercised more than 15 minutes without any evidence of ischemia or angina.  He had few PVCs which his is asymptomatic for. He recently had a stress test with work. I asked that he has then faxed to us.  [FreeTextEntry2] : Blood pressure evaluation, follow up sleep study and labs.

## 2023-01-22 NOTE — ASSESSMENT
[FreeTextEntry1] : Reviewed Testing Today:\par Zio 12/20/2022: PVC burden 24%\par  ETT 8/2/2022: Normal Stress Test \par \par Labs 12/23/2022: Creat 0.97 K+ 3.8 Mag 2.3 TSH 2.30\par \par Labs 8/3/2022: Hemoglobin 15.2 hematocrit 45.1 creatinine 1.01 potassium 4.1 AST 46 ALT 35 total cholesterol 205 HDL 66 \par \par Previous Testing:\par -Echocardiogram September 2016 revealed EF of 55%, normal left ventricular systolic function, there was trace valvular regurgitation, with normal aortic dimensions.\par \par Exercise tolerance testing September of 2016 was negative for ischemia.\par \par EKG 2-5-19 SB with 1st degree AVB.\par  \par Labs: 2-19 Normal CBC, CMP with Cr. 0.96, BUN 16, 143, K 3.7, normal LFT's, total chol 174, Trgis 68, HDL 56, \par \par Labs June 2019: , potassium 3.3, AST 55, ALT 65\par \par Labs April 2020: Creatinine 1.16.  Potassium 3.8.\par \par ETT October 2020: Exercise 15: 44 minutes on James protocol.  Peak blood pressure 160/100.  Occasional PVCs.  No ischemia or angina.\par \par Labs April 2021:  Normal hemoglobin and CBC.  FBS 78, AST and ALT 64\par \par \par

## 2023-01-22 NOTE — PHYSICAL EXAM
[Normal] : normal gait [No Edema] : no edema [No Rash] : no rash [Moves all extremities] : moves all extremities [Alert and Oriented] : alert and oriented [de-identified] : Occasional PVC

## 2023-01-22 NOTE — DISCUSSION/SUMMARY
[FreeTextEntry1] : 52 year old very active male with past medical hx of mild dyslipidemia, well controlled with lifestyle change.  Also, HTN - presently asymptomatic.\par \par PVCs: 24% burden on Zio\par -EP referral. Discussed possibility of ablation with patient and will discuss further with EP. Mostly unifocal PVCs\par -Increase Metoprolol to 1.5 tabs daily\par - Obtain 2d echocardiogram to evaluate resting heart structure, valvular function, and LVEF. \par -labs noted\par -ETT 8/2022 showing normal stress test\par \par HTN:\par Remains on  Losartan 50mg po bid and chlorthalidone 25mg po qd and low-dose Norvasc.\par Severe YARITZA -on CPAP mask treatment with good results.\par \par Continue with healthy active lifestyle.   No LVH and normal left atrium and normal diastolic function\par \par History of abnormal LFT. He declines alcohol abuse.  LFTs had come down on labs in September 2019 but they were again elevated in April 2021 with AST 64 and ALT 64.  AST 46 and ALT 35 from August 2022\par \par Thank you for this referral and allowing me to participate in the care of this patient.  If I can be of any further help or  if you have any questions, please do not hesitate to contact me\par \par EP referral. ECHO. Increase meds as above\par \par Sincerely,\par \par Louisa Riddle ANP-C\par Patients history, testing, and plan reviewed with supervising MD: Dr. Hayden Stiles MD, FACDULCE, MIMI \par \par \par Sincerely,\par \par Hayden Stiles MD, FACDULCE, MIMI

## 2023-01-24 ENCOUNTER — APPOINTMENT (OUTPATIENT)
Dept: CARDIOLOGY | Facility: CLINIC | Age: 53
End: 2023-01-24
Payer: COMMERCIAL

## 2023-01-24 PROCEDURE — 76376 3D RENDER W/INTRP POSTPROCES: CPT

## 2023-01-24 PROCEDURE — 93306 TTE W/DOPPLER COMPLETE: CPT

## 2023-01-27 ENCOUNTER — APPOINTMENT (OUTPATIENT)
Dept: ELECTROPHYSIOLOGY | Facility: CLINIC | Age: 53
End: 2023-01-27
Payer: COMMERCIAL

## 2023-01-27 ENCOUNTER — NON-APPOINTMENT (OUTPATIENT)
Age: 53
End: 2023-01-27

## 2023-01-27 VITALS
OXYGEN SATURATION: 97 % | DIASTOLIC BLOOD PRESSURE: 68 MMHG | WEIGHT: 210 LBS | HEART RATE: 52 BPM | BODY MASS INDEX: 28.44 KG/M2 | SYSTOLIC BLOOD PRESSURE: 130 MMHG | HEIGHT: 72 IN | TEMPERATURE: 97.1 F

## 2023-01-27 PROCEDURE — 99204 OFFICE O/P NEW MOD 45 MIN: CPT | Mod: 25

## 2023-01-27 PROCEDURE — 93000 ELECTROCARDIOGRAM COMPLETE: CPT

## 2023-02-02 NOTE — PHYSICAL EXAM
[No Acute Distress] : no acute distress [Normal Venous Pressure] : normal venous pressure [Normal S1, S2] : normal S1, S2 [Clear Lung Fields] : clear lung fields [No Edema] : no edema [Alert and Oriented] : alert and oriented

## 2023-03-14 ENCOUNTER — APPOINTMENT (OUTPATIENT)
Dept: CARDIOLOGY | Facility: CLINIC | Age: 53
End: 2023-03-14
Payer: COMMERCIAL

## 2023-03-14 VITALS
OXYGEN SATURATION: 96 % | TEMPERATURE: 97.8 F | HEART RATE: 66 BPM | WEIGHT: 214 LBS | BODY MASS INDEX: 29.02 KG/M2 | DIASTOLIC BLOOD PRESSURE: 80 MMHG | SYSTOLIC BLOOD PRESSURE: 140 MMHG

## 2023-03-14 PROCEDURE — 99214 OFFICE O/P EST MOD 30 MIN: CPT

## 2023-03-14 NOTE — REASON FOR VISIT
[FreeTextEntry1] : 53 year old white male with family history of HTN and personal hx of HTN, YARITZA on treatment, asymptomatic sinus bradycardia and frequent PVCs.\par \par He denies chest pain, pressure,  unusual shortness of breath, GARCIA, orthopnea, LE edema, lightheadedness, dizziness, near syncope or syncope. He complains of intermittent rare palpitations. Today he presents to review his recent testing.\par \par Hypertension:  on Losartan 50MG bid and  chlorthalidone 25mg QD and Norvasc 2.5 mg.  Blood pressure not ideally controlled today.\par \par He continues to exercise regularly with weight lifting and performing cardiovascular exercise.   There are no exertional symptoms. \par \par He eats very "clean", lots of lean protein, water and follows low carb diet.  He avoids salt.\par \par Sleep study 2/22/18 reveals severe sleep apnea.  CPAP titration study completed and now appropriately treated, with success. Fatigue has improved significantly. \par \par He works [email or web address].  [employer].  Has annual physical with F. D.\par \par On ETT in October 2020, he exercised more than 15 minutes without any evidence of ischemia or angina.  He had few PVCs which his is asymptomatic for. He recently had a stress test with work. I asked that he has then faxed to us. \par \par He underwent repeat echocardiogram and cardiac MRI recently after EP consultation for high but asymptomatic PVC burden\par \par Mild hypokalemia was detected on recent labs in February 2023.  Patient is on chlorthalidone 25 mg daily [Follow-Up - Clinic] : a clinic follow-up of [FreeTextEntry2] : Blood pressure evaluation, follow up sleep study and labs.

## 2023-03-14 NOTE — DISCUSSION/SUMMARY
[FreeTextEntry1] : 53 year old very active male with past medical hx of mild dyslipidemia, well controlled with lifestyle change.  Also, HTN - presently asymptomatic.\par \par PVCs: 24% burden on Zio.  Prolonged auscultation today, no PVCs.\par -EP consult patient was obtained.  Patient on metoprolol.  Cardiac MRI noted as above.  Follow-up with EP.  Suggest magnesium supplements.  Check mag level and ESR.\par -Continue metoprolol to 1.5 tabs daily.  Repeat ZIO\par \par -ETT 8/2022 showing normal stress test\par \par HTN:\par -For hypertension, increase Norvasc to 5 mg daily.  Reduce chlorthalidone to half a tablet due to hyperkalemia\par Severe YARITZA -on CPAP mask treatment with good results.\par \par Continue with healthy active lifestyle.   No LVH and normal left atrium and normal diastolic function\par \par History of abnormal LFT. He declines alcohol abuse.  SGOT was slightly elevated on labs in February\par \par Thank you for this referral and allowing me to participate in the care of this patient.  If I can be of any further help or  if you have any questions, please do not hesitate to contact me\par \par Thank you for this referral and allowing me to participate in the care of this patient.  If I can be of any further help or  if you have any questions, please do not hesitate to contact me\par \par \par Sincerely,\par \par Hayden Stiles MD, FAC, MIMI

## 2023-03-14 NOTE — ASSESSMENT
[FreeTextEntry1] : Reviewed Testing Today:\par \par Zio 12/20/2022: PVC burden 24%\par \par  ETT 8/2/2022: Normal Stress Test \par \par Labs 12/23/2022: Creat 0.97 K+ 3.8 Mag 2.3 TSH 2.30\par \par Labs 8/3/2022: Hemoglobin 15.2 hematocrit 45.1 creatinine 1.01 potassium 4.1 AST 46 ALT 35 total cholesterol 205 HDL 66 \par \par Echocardiogram January 2023: Normal LV size and function. Normal wall motion.\par \par Cardiac MRI February 2023: Focal wall motion abnormality noted.  Inflammation versus scar?\par \par Previous Testing:\par -Echocardiogram September 2016 revealed EF of 55%, normal left ventricular systolic function, there was trace valvular regurgitation, with normal aortic dimensions.\par \par Exercise tolerance testing September of 2016 was negative for ischemia.\par \par EKG 2-5-19 SB with 1st degree AVB.\par  \par Labs: 2-19 Normal CBC, CMP with Cr. 0.96, BUN 16, 143, K 3.7, normal LFT's, total chol 174, Trgis 68, HDL 56, \par \par Labs June 2019: , potassium 3.3, AST 55, ALT 65\par \par Labs April 2020: Creatinine 1.16.  Potassium 3.8.\par \par ETT October 2020: Exercise 15: 44 minutes on James protocol.  Peak blood pressure 160/100.  Occasional PVCs.  No ischemia or angina.\par \par Labs April 2021:  Normal hemoglobin and CBC.  FBS 78, AST and ALT 64\par \par \par

## 2023-03-14 NOTE — PHYSICAL EXAM
[Normal] : normal gait [No Edema] : no edema [No Rash] : no rash [Moves all extremities] : moves all extremities [Alert and Oriented] : alert and oriented [de-identified] : No PVCs today

## 2023-04-06 ENCOUNTER — APPOINTMENT (OUTPATIENT)
Dept: CARDIOLOGY | Facility: CLINIC | Age: 53
End: 2023-04-06
Payer: COMMERCIAL

## 2023-04-06 PROCEDURE — ZZZZZ: CPT

## 2023-04-19 ENCOUNTER — NON-APPOINTMENT (OUTPATIENT)
Age: 53
End: 2023-04-19

## 2023-04-19 ENCOUNTER — APPOINTMENT (OUTPATIENT)
Dept: ELECTROPHYSIOLOGY | Facility: CLINIC | Age: 53
End: 2023-04-19
Payer: COMMERCIAL

## 2023-04-19 PROCEDURE — 93000 ELECTROCARDIOGRAM COMPLETE: CPT

## 2023-04-19 PROCEDURE — 99214 OFFICE O/P EST MOD 30 MIN: CPT | Mod: 25

## 2023-04-19 RX ORDER — AMLODIPINE BESYLATE 2.5 MG/1
2.5 TABLET ORAL TWICE DAILY
Qty: 90 | Refills: 2 | Status: DISCONTINUED | COMMUNITY
Start: 2019-07-24 | End: 2023-04-19

## 2023-04-30 NOTE — HISTORY OF PRESENT ILLNESS
[FreeTextEntry1] : \par \par Patient is a very pleasant 61-year-old male who is seen in evaluation regarding PVCs.\par \par He was seen a few weeks ago and was noted to have PVCs.  Patient was not aware of it and was asymptomatic.  He does not feel the PVCs at this time and denies any chest pain, shortness of breath, dizziness, lightness, syncope or presyncope.\par \par He is active and has no exercise-induced symptoms.\par \par He has a prior history of hypertension and previous bradycardia.\par \par Patient works out vigorously with no symptoms.  He has had a prior history of sleep apnea and uses CPAP for about 8 hours.\par \par His intake includes occasional creatinine, moderate amount of caffeine and social alcohol.  He denies nicotine.\par \par His laboratory data was reviewed and electrolytes were within normal range.\par \par Transthoracic echocardiogram performed 1/20/2020 showed normal left ventricular function with EF 60%.  The left atrial size is normal, trace mitral regurgitation and trace tricuspid regurgitation.  The right ventricular systolic function was also normal.\par \par He had a monitor for 3 days starting 12/20/2022 that showed sinus rhythm with occasional APCs, PVCs, ventricular couplets, ventricular bigeminy and trigeminy.  The PVC frequency was reported 24%.  The PVC were different morphology.\par \par He had a stress test performed at [place] 8/2/2022 exercised to stage V James protocol and no electrocardiographic evidence of ischemia the stress test was read as normal.\par \par The patient had a previous sleep study done in 2018 that also showed occasional PVC.  He is felt to have severe sleep apnea and CPAP was recommended.  \par \par pLAN: mri AND REMONITOR - HE WILL COME BACK FOR THE MONITOR\par

## 2023-04-30 NOTE — REVIEW OF SYSTEMS
[Feeling Fatigued] : not feeling fatigued [SOB] : no shortness of breath [Palpitations] : no palpitations [Cough] : no cough [Abdominal Pain] : no abdominal pain [Under Stress] : not under stress [Easy Bleeding] : no tendency for easy bleeding

## 2023-04-30 NOTE — HISTORY OF PRESENT ILLNESS
[FreeTextEntry1] : Follow-up evaluation for assessment of PVCs.  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  The patient is feeling well and has no symptoms.  He is on metoprolol succinate ER 25 mg 1/2 tablet daily.  Patient is tolerating medication well.\par He had the cardiac MRI scan performed on 2/6/2023\par  The left ventricular function showed mild reduction in systolic function with focal hypokinetic wall motion abnormality in the mid inferior lateral.  No LGE was present\par His Zio patch monitor from 4/6/2023 for 2 days showed sinus rhythm with ventricular ectopy decreasing from 24% to 6.6%.\par \par \par ________________________________________________________________________________________\par \par

## 2023-04-30 NOTE — DISCUSSION/SUMMARY
[EKG obtained to assist in diagnosis and management of assessed problem(s)] : EKG obtained to assist in diagnosis and management of assessed problem(s) [FreeTextEntry1] : Overall the patient is feeling well.  He does get occasional waves that may be related to his slow heartbeat.  His dose of amlodipine was recently increased from 2.5 to 5 mg.  He is also on losartan 50 mg twice daily.  The patient is also on metoprolol succinate ER 25 mg 1.5 mg twice daily.  This combination of medicines may be causing him some of symptoms.  The monitor was reviewed and it showed that he had the isolated ventricular ectopy with at least 3 different morphology but the frequency based on the 2-day monitor\par \par The left ventricular function showed mild reduction in systolic function with focal hypokinetic wall motion abnormality in the mid inferior lateral.  No LGE was present\par \par Patient is concerned with all his medications especially his blood pressure medications.  Recommend increasing losartan to 2 tablets in the morning 1 tablet p.m. and discontinue amlodipine for now. \par \par We will obtain a stress test on him to further assess this hypokinetic area and it is possible in the future we may consider angiography.\par \par \par Plan: Obtain nuclear exercise stress test.  Based on the finding consider coronary CT.\par \par Follow-up discussion after these testing.

## 2023-04-30 NOTE — DISCUSSION/SUMMARY
[EKG obtained to assist in diagnosis and management of assessed problem(s)] : EKG obtained to assist in diagnosis and management of assessed problem(s) [FreeTextEntry1] : This is a 53-year-old with preserved left ventricular function who has had frequent PVC with different morphology.  He had an abnormal sleep study and the patient uses CPAP.  He is not symptomatic from the PVCs.  Prior exercise stress test did not show any exercise-induced ischemia.\par \par Even though he is asymptomatic the density PVC warrants further assessment.  We will start with lifestyle adjustments and he would his intake of caffeine and creatinine.\par \par We will obtain a cardiac MRI scan especially given the fact that the PVCs are different morphologies.  Continue him on low-dose beta-blocker and get a follow-up monitor after all these adjustments.  Patient will return to electrophysiology for further assessment after cardiac MRI scan and follow-up monitoring.\par \par \par \par Recommendations and follow up plans discussed in detail with patient. The patient verbalized understanding.\par \par

## 2023-04-30 NOTE — PHYSICAL EXAM
[No Acute Distress] : no acute distress [Normal S1, S2] : normal S1, S2 [Normal Venous Pressure] : normal venous pressure [No Murmur] : no murmur [Clear Lung Fields] : clear lung fields [Non Tender] : non-tender [No Rash] : no rash [No Focal Deficits] : no focal deficits

## 2023-04-30 NOTE — REVIEW OF SYSTEMS
[Feeling Fatigued] : not feeling fatigued [SOB] : no shortness of breath [Palpitations] : no palpitations [Chest Discomfort] : no chest discomfort [Cough] : no cough [Abdominal Pain] : no abdominal pain [Under Stress] : not under stress [Easy Bleeding] : no tendency for easy bleeding

## 2023-05-04 ENCOUNTER — APPOINTMENT (OUTPATIENT)
Dept: CARDIOLOGY | Facility: CLINIC | Age: 53
End: 2023-05-04
Payer: COMMERCIAL

## 2023-05-04 PROCEDURE — 78452 HT MUSCLE IMAGE SPECT MULT: CPT

## 2023-05-04 PROCEDURE — A9502: CPT

## 2023-05-04 PROCEDURE — 93015 CV STRESS TEST SUPVJ I&R: CPT

## 2023-05-10 RX ORDER — ASPIRIN ENTERIC COATED TABLETS 81 MG 81 MG/1
81 TABLET, DELAYED RELEASE ORAL
Refills: 0 | Status: ACTIVE | COMMUNITY
Start: 2023-05-10

## 2023-06-05 ENCOUNTER — APPOINTMENT (OUTPATIENT)
Dept: ELECTROPHYSIOLOGY | Facility: CLINIC | Age: 53
End: 2023-06-05
Payer: COMMERCIAL

## 2023-06-05 VITALS
BODY MASS INDEX: 27.9 KG/M2 | WEIGHT: 206 LBS | HEART RATE: 56 BPM | SYSTOLIC BLOOD PRESSURE: 134 MMHG | OXYGEN SATURATION: 95 % | DIASTOLIC BLOOD PRESSURE: 70 MMHG | HEIGHT: 72 IN

## 2023-06-05 PROCEDURE — 99214 OFFICE O/P EST MOD 30 MIN: CPT | Mod: 25

## 2023-06-05 PROCEDURE — 93000 ELECTROCARDIOGRAM COMPLETE: CPT

## 2023-06-05 NOTE — PHYSICAL EXAM
[No Acute Distress] : no acute distress [Normal Venous Pressure] : normal venous pressure [Normal S1, S2] : normal S1, S2 [No Murmur] : no murmur [Clear Lung Fields] : clear lung fields [Non Tender] : non-tender [No Rash] : no rash [No Focal Deficits] : no focal deficits

## 2023-07-05 ENCOUNTER — NON-APPOINTMENT (OUTPATIENT)
Age: 53
End: 2023-07-05

## 2023-07-06 NOTE — REVIEW OF SYSTEMS
[Feeling Fatigued] : not feeling fatigued [SOB] : no shortness of breath [Chest Discomfort] : no chest discomfort [Palpitations] : no palpitations [Cough] : no cough [Abdominal Pain] : no abdominal pain [Under Stress] : not under stress [Easy Bleeding] : no tendency for easy bleeding

## 2023-07-06 NOTE — HISTORY OF PRESENT ILLNESS
[FreeTextEntry1] : Patient is a 53-year-old man who is seen in follow-up evaluation for his prior PVCs\par \par He is feeling well and remains asymptomatic from the PVCs.  His previous evaluation has shown a great reduction in PVC burden from 24% to 6%\par \par He remains asymptomatic\par Patient had a nuclear stress test performed on 5//23: No significant ST changes.  No evidence of exercise-induced ischemia.  Myocardial perfusion was read as abnormal: Small size mild defect in anterior apex that is reversible.\par The patient had a cardiac MRI scan and then subsequently a stress test which there is question of very mild defect.  Plan is to get a coronary CTA to fully evaluate.  In the meantime I recommended he continue current medications which include metoprolol.\par \par We will try to obtain it with the coronary CTA at [facility].  \par \par \par He had the cardiac MRI scan performed on 2/6/2023\par  The left ventricular function showed mild reduction in systolic function with focal hypokinetic wall motion abnormality in the mid inferior lateral.  No LGE was present\par His Zio patch monitor from 4/6/2023 for 2 days showed sinus rhythm with ventricular ectopy decreasing from 24% to 6.6%.\par \par \par ________________________________________________________________________________________\par \par

## 2023-07-06 NOTE — DISCUSSION/SUMMARY
[EKG obtained to assist in diagnosis and management of assessed problem(s)] : EKG obtained to assist in diagnosis and management of assessed problem(s) [FreeTextEntry1] : Impression\par PVCs - remains asymptomatic from the PVCs. Overall the patient is feeling well. Decrease [place] from 24 to 6 % . He had  at least 3 different morphologies  \par \par The left ventricular function showed mild reduction in systolic function with focal hypokinetic wall motion abnormality in the mid inferior lateral.  No LGE was present\par \par Patient is concerned with all his medications especially his blood pressure medications.  Recommend increasing losartan to 2 tablets in the morning 1 tablet p.m. and discontinue amlodipine for now. \par \par Plan: based on result of  nuclear exercise stress jaspreet will obtain coronary CT.\par \par Follow-up discussion after these testing.

## 2023-07-24 RX ORDER — POTASSIUM CHLORIDE 750 MG/1
10 TABLET, FILM COATED, EXTENDED RELEASE ORAL DAILY
Qty: 180 | Refills: 3 | Status: ACTIVE | COMMUNITY
Start: 2023-07-24 | End: 1900-01-01

## 2023-07-24 RX ORDER — POTASSIUM CHLORIDE 750 MG/1
10 CAPSULE, EXTENDED RELEASE ORAL
Qty: 90 | Refills: 3 | Status: DISCONTINUED | COMMUNITY
Start: 2018-09-11 | End: 2023-07-24

## 2023-09-05 ENCOUNTER — NON-APPOINTMENT (OUTPATIENT)
Age: 53
End: 2023-09-05

## 2023-09-26 ENCOUNTER — APPOINTMENT (OUTPATIENT)
Dept: CARDIOLOGY | Facility: CLINIC | Age: 53
End: 2023-09-26
Payer: COMMERCIAL

## 2023-09-26 VITALS
DIASTOLIC BLOOD PRESSURE: 84 MMHG | OXYGEN SATURATION: 97 % | SYSTOLIC BLOOD PRESSURE: 146 MMHG | BODY MASS INDEX: 29.12 KG/M2 | HEART RATE: 62 BPM | HEIGHT: 72 IN | WEIGHT: 215 LBS

## 2023-09-26 DIAGNOSIS — Z87.898 PERSONAL HISTORY OF OTHER SPECIFIED CONDITIONS: ICD-10-CM

## 2023-09-26 PROCEDURE — 99214 OFFICE O/P EST MOD 30 MIN: CPT

## 2024-01-10 ENCOUNTER — APPOINTMENT (OUTPATIENT)
Dept: ELECTROPHYSIOLOGY | Facility: CLINIC | Age: 54
End: 2024-01-10
Payer: COMMERCIAL

## 2024-01-10 VITALS
WEIGHT: 208 LBS | DIASTOLIC BLOOD PRESSURE: 80 MMHG | HEART RATE: 47 BPM | SYSTOLIC BLOOD PRESSURE: 138 MMHG | BODY MASS INDEX: 28.17 KG/M2 | OXYGEN SATURATION: 96 % | HEIGHT: 72 IN

## 2024-01-10 PROCEDURE — 93000 ELECTROCARDIOGRAM COMPLETE: CPT

## 2024-01-10 PROCEDURE — 99214 OFFICE O/P EST MOD 30 MIN: CPT | Mod: 25

## 2024-01-10 RX ORDER — METOPROLOL SUCCINATE 25 MG/1
25 TABLET, EXTENDED RELEASE ORAL
Qty: 180 | Refills: 1 | Status: DISCONTINUED | COMMUNITY
Start: 2022-12-29 | End: 2024-01-10

## 2024-01-10 NOTE — HISTORY OF PRESENT ILLNESS
[FreeTextEntry1] : Patient is a 53-year-old man who is seen in follow-up evaluation for his previous PVCs.  He has been feeling extremely well and is not bothered by the PVC.  However previous his medicine assessment had shown that he had a decline in his PVC burden from 24% to 6%.  He had evaluations that included a cardiac MRI scan that did not show any LGE.  He is currently exercising without any symptoms.  Patient is a 53-year-old man who is seen in follow-up evaluation for his prior PVCs  Patient has been on medications including chlorthalidone, losartan, metoprolol, potassium supplementation and lovastatin. Patient had a nuclear stress test performed on 5//23: No significant ST changes.  No evidence of exercise-induced ischemia.  Myocardial perfusion was read as abnormal: Small size mild defect in anterior apex that is reversible. The patient had a cardiac MRI scan and then subsequently a stress test which there is question of very mild defect.  Plan is to get a coronary CTA to fully evaluate.  In the meantime I recommended he continue current medications which include metoprolol.  We will try to obtain it with the coronary CTA at [facility].     He had the cardiac MRI scan performed on 2/6/2023  The left ventricular function showed mild reduction in systolic function with focal hypokinetic wall motion abnormality in the mid inferior lateral.  No LGE was present His Zio patch monitor from 4/6/2023 for 2 days showed sinus rhythm with ventricular ectopy decreasing from 24% to 6.6%.   ________________________________________________________________________________________

## 2024-01-10 NOTE — DISCUSSION/SUMMARY
[EKG obtained to assist in diagnosis and management of assessed problem(s)] : EKG obtained to assist in diagnosis and management of assessed problem(s) [FreeTextEntry1] : Impressions the patient is doing extremely well and is not bothered by the PVCs and he has no need for undergoing catheter ablation procedure at this point.  I would recommend that we continue current medications and we should do follow-up monitoring him to ensure that the PVC burden is less than 10%.  He had a history of high blood pressure is on medications include losartan and chlorthalidone and metoprolol which is for PVCs but also help with his blood pressure.  His blood pressure readings has been in the normal range.  Final plan is to get a follow-up monitor ( ordered) to reassess his PVC burden.  I have ordered a Zio patch for 5 days.

## 2024-01-17 ENCOUNTER — APPOINTMENT (OUTPATIENT)
Dept: ELECTROPHYSIOLOGY | Facility: CLINIC | Age: 54
End: 2024-01-17

## 2024-02-14 ENCOUNTER — APPOINTMENT (OUTPATIENT)
Dept: CARDIOLOGY | Facility: CLINIC | Age: 54
End: 2024-02-14
Payer: COMMERCIAL

## 2024-02-14 PROCEDURE — 93242 EXT ECG>48HR<7D RECORDING: CPT

## 2024-03-05 PROCEDURE — 93244 EXT ECG>48HR<7D REV&INTERPJ: CPT | Mod: 1L

## 2024-03-11 ENCOUNTER — APPOINTMENT (OUTPATIENT)
Dept: CARDIOLOGY | Facility: CLINIC | Age: 54
End: 2024-03-11
Payer: COMMERCIAL

## 2024-03-11 VITALS
HEIGHT: 72 IN | RESPIRATION RATE: 14 BRPM | OXYGEN SATURATION: 97 % | SYSTOLIC BLOOD PRESSURE: 138 MMHG | TEMPERATURE: 98 F | WEIGHT: 205 LBS | BODY MASS INDEX: 27.77 KG/M2 | HEART RATE: 71 BPM | DIASTOLIC BLOOD PRESSURE: 84 MMHG

## 2024-03-11 DIAGNOSIS — I49.3 VENTRICULAR PREMATURE DEPOLARIZATION: ICD-10-CM

## 2024-03-11 DIAGNOSIS — G47.33 OBSTRUCTIVE SLEEP APNEA (ADULT) (PEDIATRIC): ICD-10-CM

## 2024-03-11 DIAGNOSIS — I10 ESSENTIAL (PRIMARY) HYPERTENSION: ICD-10-CM

## 2024-03-11 DIAGNOSIS — Z86.39 PERSONAL HISTORY OF OTHER ENDOCRINE, NUTRITIONAL AND METABOLIC DISEASE: ICD-10-CM

## 2024-03-11 PROCEDURE — 99214 OFFICE O/P EST MOD 30 MIN: CPT

## 2024-03-11 PROCEDURE — G2211 COMPLEX E/M VISIT ADD ON: CPT

## 2024-03-11 RX ORDER — ROSUVASTATIN CALCIUM 10 MG/1
10 TABLET, FILM COATED ORAL
Qty: 90 | Refills: 3 | Status: DISCONTINUED | COMMUNITY
Start: 2023-10-06 | End: 2024-03-11

## 2024-03-11 NOTE — REASON FOR VISIT
[FreeTextEntry1] : 54 year old white male with family history of HTN and personal hx of HTN, YARITZA on treatment, asymptomatic sinus bradycardia and frequent PVCs.  He denies chest pain, pressure, no exertional shortness of breath, no orthopnea, LE edema, lightheadedness, dizziness, near syncope or syncope.   Hypertension:  on Losartan 50MG bid and  chlorthalidone 25mg QD.  Blood pressure not ideally controlled today.  He continues to exercise regularly with weight lifting and performing cardiovascular exercise.   There are no exertional symptoms.   He eats very "clean", lots of lean protein, water and follows low carb diet.  He avoids salt.  Sleep study 2/22/18 reveals severe sleep apnea.  CPAP titration study completed and now appropriately treated, with success. Fatigue has improved significantly.   He works [email or web address]  [place] [employer]  Has annual physical with FRony CONNORS.  On ETT in October 2020, he exercised more than 15 minutes without any evidence of ischemia or angina.  He had few PVCs which his is asymptomatic for. He recently had a stress test with work. I asked that he has then faxed to us.   He underwent repeat echocardiogram and cardiac MRI recently after EP consultation for high but asymptomatic PVC burden.  MRI was unremarkable and fortunately, PVC burden declined significantly as per EP notes.  Mild hypokalemia was detected on recent labs in February 2023.  Patient is on chlorthalidone 25 mg daily.  Now on supplements.  CTA of coronaries showed minimal disease in proximal LAD with calcium score of only 9.   [FreeTextEntry2] : Blood pressure evaluation, follow up sleep study and labs. [Follow-Up - Clinic] : a clinic follow-up of

## 2024-03-11 NOTE — DISCUSSION/SUMMARY
[FreeTextEntry1] : 54 year old very active male   Hypertension.  Not ideally controlled.  Target 130/80 mmHg or less  PVCs: 24% burden on Zio initially at 1 time.  Subsequent monitoring showed significant reduction in PVC burden.  Prolonged auscultation today, no PVCs. EP consult patient was obtained.  Patient on metoprolol.  Cardiac MRI noted as above.  Patient is on magnesium supplements.  Continue metoprolol to 1 tablet twice daily  ETT 8/2022 showing normal stress test.  Minimal CAD by CTA of coronaries in the proximal LAD.  Calcium score of only 9.  Severe YARITZA -on CPAP mask treatment with good results.  Continue with healthy active lifestyle.   No LVH and normal left atrium and normal diastolic function  Follow-up in 6 to 9 months.  Earlier if there is increase of palpitations.  Or any other cardiac symptoms  History of abnormal LFT. He declines alcohol abuse.  SGOT was slightly elevated on labs in February  Thank you for this referral and allowing me to participate in the care of this patient.  If I can be of any further help or  if you have any questions, please do not hesitate to contact me   Sincerely,  Hayden Stiles MD, FACC, MIIM

## 2024-03-11 NOTE — PHYSICAL EXAM
[Normal] : normal gait [No Edema] : no edema [No Rash] : no rash [Moves all extremities] : moves all extremities [Alert and Oriented] : alert and oriented [de-identified] : No PVCs today

## 2024-03-30 ENCOUNTER — RX RENEWAL (OUTPATIENT)
Age: 54
End: 2024-03-30

## 2024-04-01 RX ORDER — LOSARTAN POTASSIUM 50 MG/1
50 TABLET, FILM COATED ORAL
Qty: 180 | Refills: 2 | Status: ACTIVE | COMMUNITY
Start: 2018-09-11 | End: 1900-01-01

## 2024-05-02 RX ORDER — METOPROLOL TARTRATE 25 MG/1
25 TABLET, FILM COATED ORAL TWICE DAILY
Qty: 180 | Refills: 1 | Status: ACTIVE | COMMUNITY
Start: 2024-01-10 | End: 1900-01-01

## 2024-05-10 RX ORDER — CHLORTHALIDONE 25 MG/1
25 TABLET ORAL
Qty: 90 | Refills: 2 | Status: ACTIVE | COMMUNITY
Start: 2018-01-23 | End: 1900-01-01

## 2024-07-25 ENCOUNTER — APPOINTMENT (OUTPATIENT)
Dept: CARDIOLOGY | Facility: CLINIC | Age: 54
End: 2024-07-25
Payer: COMMERCIAL

## 2024-07-25 ENCOUNTER — NON-APPOINTMENT (OUTPATIENT)
Age: 54
End: 2024-07-25

## 2024-07-25 VITALS
SYSTOLIC BLOOD PRESSURE: 150 MMHG | HEIGHT: 72 IN | HEART RATE: 65 BPM | DIASTOLIC BLOOD PRESSURE: 82 MMHG | OXYGEN SATURATION: 97 % | BODY MASS INDEX: 28.44 KG/M2 | WEIGHT: 210 LBS

## 2024-07-25 DIAGNOSIS — I49.3 VENTRICULAR PREMATURE DEPOLARIZATION: ICD-10-CM

## 2024-07-25 DIAGNOSIS — Z86.39 PERSONAL HISTORY OF OTHER ENDOCRINE, NUTRITIONAL AND METABOLIC DISEASE: ICD-10-CM

## 2024-07-25 DIAGNOSIS — I10 ESSENTIAL (PRIMARY) HYPERTENSION: ICD-10-CM

## 2024-07-25 PROCEDURE — 99214 OFFICE O/P EST MOD 30 MIN: CPT

## 2024-07-25 PROCEDURE — 93000 ELECTROCARDIOGRAM COMPLETE: CPT

## 2024-07-25 RX ORDER — SPIRONOLACTONE 25 MG/1
25 TABLET ORAL DAILY
Qty: 90 | Refills: 3 | Status: ACTIVE | COMMUNITY
Start: 2024-07-25 | End: 1900-01-01

## 2024-07-25 NOTE — PHYSICAL EXAM
[Normal] : moves all extremities, no focal deficits, normal speech
[Normal] : moves all extremities, no focal deficits, normal speech
Alert and oriented to person, place and time

## 2024-07-26 NOTE — HISTORY OF PRESENT ILLNESS
[FreeTextEntry1] : 54 year old male with PMHx of HTN, PVCs presents because he has his chlorthalidone recently discontinued because of low potassium level at 3.1. Also currently taking losartan 100mg in the morning and 50mg nightly. Patient has no chest pain, dyspnea or palpitations.

## 2024-07-26 NOTE — DISCUSSION/SUMMARY
[EKG obtained to assist in diagnosis and management of assessed problem(s)] : EKG obtained to assist in diagnosis and management of assessed problem(s) [FreeTextEntry1] : 1. HTN: I reviewed previous labs with patient from 10/2023, when the plasma aldosterone concentration was 21 and the plasma renin activity was <0.167. Given these findings along with the history of hypokalemia, this is essentially diagnostic of primary hyperaldosteronism, which is likely contributing to patient's uncontrolled HTN. Patient should have a CT of the abdomen to evaluate the adrenal glands for adenoma. I referred patient to endocrinology to have this workup done. In the meantime, my recommendation is to reduce the dose of losartan to 50mg BID and start spironolactone 25mg daily instead.   2. PVCs: continue metoprolol 25mg BID.   Follow up with Dr. Stiles in December 2023.

## 2024-08-19 ENCOUNTER — NON-APPOINTMENT (OUTPATIENT)
Age: 54
End: 2024-08-19

## 2024-08-20 ENCOUNTER — APPOINTMENT (OUTPATIENT)
Dept: ENDOCRINOLOGY | Facility: CLINIC | Age: 54
End: 2024-08-20
Payer: COMMERCIAL

## 2024-08-20 VITALS
HEART RATE: 54 BPM | HEIGHT: 72 IN | BODY MASS INDEX: 27.63 KG/M2 | SYSTOLIC BLOOD PRESSURE: 158 MMHG | RESPIRATION RATE: 16 BRPM | OXYGEN SATURATION: 98 % | DIASTOLIC BLOOD PRESSURE: 96 MMHG | WEIGHT: 204 LBS

## 2024-08-20 DIAGNOSIS — I10 ESSENTIAL (PRIMARY) HYPERTENSION: ICD-10-CM

## 2024-08-20 DIAGNOSIS — E26.09 OTHER PRIMARY HYPERALDOSTERONISM: ICD-10-CM

## 2024-08-20 PROCEDURE — 99204 OFFICE O/P NEW MOD 45 MIN: CPT

## 2024-08-20 NOTE — HISTORY OF PRESENT ILLNESS
[FreeTextEntry1] : Referred here by Dr. Calderon for concern of primary hyperaldosteronism.   Diagnosed with HTN about 6 years ago.   His BP has been difficult to control requiring multiple agents.   He developed spontaneous hypokalemia when started on Chlorthalidone in the past.   Labs in 9/2023 showed PRA < 0.167 and Aldosterone of 21.   He was since started on Spironolactone 25 mg daily and his thiazide diuretic was removed.      His BP has been elevated since the recent medication change.  He currently denies HA, CP or palpitations.

## 2024-08-20 NOTE — REASON FOR VISIT
[Initial Evaluation] : an initial evaluation [Adrenal Evaluation/Adrenal Disorder] : adrenal evaluation/adrenal disorder [FreeTextEntry2] :

## 2024-08-20 NOTE — CONSULT LETTER
[Dear  ___] : Dear  [unfilled], [Consult Letter:] : I had the pleasure of evaluating your patient, [unfilled]. [Please see my note below.] : Please see my note below. [Consult Closing:] : Thank you very much for allowing me to participate in the care of this patient.  If you have any questions, please do not hesitate to contact me. [Sincerely,] : Sincerely, [FreeTextEntry3] : Juancarlos Garcia MD, FACE Chief, Div. of Endocrinology, [facility], [facility] , [employer] [facility]

## 2024-08-20 NOTE — REVIEW OF SYSTEMS
[Fatigue] : no fatigue [Chest Pain] : no chest pain [Palpitations] : no palpitations [Shortness Of Breath] : no shortness of breath [Nausea] : no nausea [Headaches] : no headaches [Abdominal Pain] : no abdominal pain

## 2024-08-20 NOTE — ASSESSMENT
[FreeTextEntry1] : 54 year old male with HTN requiring multiple agents with labs highly suggestive of primary hyperaldosteronism (suppressed PRA and elevated aldosterone in setting of spontaneous hypokalemia on thiazide diuretic).    1.  Primary Hyperaldosteronism-  the above is diagnostic of Primary Hyperaldosteronism and confirmation biochemical testing is not needed.  Will order CT abdomen with adrenal protocol.  If a clear lesion is identified, will refer to surgery.  In the meantime, will increase Spironolactone to 25 mg BID to better block effects of aldosterone (but we may need much higher doses).    2.  HTN-  needs improved control prior to consideration of surgery.  Continue Losartan and Metoprolol and will increase spironolactone as started above.   Follow up to be determined based on results of CT scan.

## 2024-09-10 ENCOUNTER — NON-APPOINTMENT (OUTPATIENT)
Age: 54
End: 2024-09-10

## 2024-10-02 ENCOUNTER — NON-APPOINTMENT (OUTPATIENT)
Age: 54
End: 2024-10-02

## 2024-11-11 ENCOUNTER — APPOINTMENT (OUTPATIENT)
Dept: ORTHOPEDIC SURGERY | Facility: CLINIC | Age: 54
End: 2024-11-11
Payer: COMMERCIAL

## 2024-11-11 DIAGNOSIS — M75.22 BICIPITAL TENDINITIS, LEFT SHOULDER: ICD-10-CM

## 2024-11-11 DIAGNOSIS — M75.42 IMPINGEMENT SYNDROME OF LEFT SHOULDER: ICD-10-CM

## 2024-11-11 DIAGNOSIS — M75.52 BURSITIS OF LEFT SHOULDER: ICD-10-CM

## 2024-11-11 PROCEDURE — 73030 X-RAY EXAM OF SHOULDER: CPT | Mod: LT

## 2024-11-11 PROCEDURE — 99203 OFFICE O/P NEW LOW 30 MIN: CPT | Mod: 25

## 2024-11-11 RX ORDER — METHYLPREDNISOLONE 4 MG/1
4 TABLET ORAL
Qty: 1 | Refills: 0 | Status: ACTIVE | COMMUNITY
Start: 2024-11-11 | End: 1900-01-01

## 2024-12-05 ENCOUNTER — RESULT REVIEW (OUTPATIENT)
Age: 54
End: 2024-12-05

## 2024-12-16 ENCOUNTER — APPOINTMENT (OUTPATIENT)
Dept: CARDIOLOGY | Facility: CLINIC | Age: 54
End: 2024-12-16
Payer: COMMERCIAL

## 2024-12-16 VITALS
SYSTOLIC BLOOD PRESSURE: 154 MMHG | BODY MASS INDEX: 28.48 KG/M2 | DIASTOLIC BLOOD PRESSURE: 88 MMHG | OXYGEN SATURATION: 99 % | HEART RATE: 54 BPM | WEIGHT: 210 LBS

## 2024-12-16 DIAGNOSIS — I49.3 VENTRICULAR PREMATURE DEPOLARIZATION: ICD-10-CM

## 2024-12-16 DIAGNOSIS — E26.09 OTHER PRIMARY HYPERALDOSTERONISM: ICD-10-CM

## 2024-12-16 DIAGNOSIS — I10 ESSENTIAL (PRIMARY) HYPERTENSION: ICD-10-CM

## 2024-12-16 DIAGNOSIS — Z86.39 PERSONAL HISTORY OF OTHER ENDOCRINE, NUTRITIONAL AND METABOLIC DISEASE: ICD-10-CM

## 2024-12-16 DIAGNOSIS — G47.33 OBSTRUCTIVE SLEEP APNEA (ADULT) (PEDIATRIC): ICD-10-CM

## 2024-12-16 PROCEDURE — G2211 COMPLEX E/M VISIT ADD ON: CPT | Mod: NC

## 2024-12-16 PROCEDURE — 99214 OFFICE O/P EST MOD 30 MIN: CPT

## 2024-12-16 RX ORDER — POTASSIUM CHLORIDE 600 MG/1
8 TABLET, FILM COATED, EXTENDED RELEASE ORAL
Qty: 30 | Refills: 0 | Status: ACTIVE | COMMUNITY
Start: 2024-12-16 | End: 1900-01-01

## 2024-12-16 RX ORDER — HYDROCHLOROTHIAZIDE 12.5 MG/1
12.5 TABLET ORAL DAILY
Qty: 30 | Refills: 0 | Status: ACTIVE | COMMUNITY
Start: 2024-12-16 | End: 1900-01-01

## 2024-12-20 ENCOUNTER — APPOINTMENT (OUTPATIENT)
Dept: ORTHOPEDIC SURGERY | Facility: CLINIC | Age: 54
End: 2024-12-20

## 2024-12-20 DIAGNOSIS — M75.52 BURSITIS OF LEFT SHOULDER: ICD-10-CM

## 2024-12-20 DIAGNOSIS — M75.42 IMPINGEMENT SYNDROME OF LEFT SHOULDER: ICD-10-CM

## 2024-12-20 DIAGNOSIS — M75.22 BICIPITAL TENDINITIS, LEFT SHOULDER: ICD-10-CM

## 2024-12-20 PROCEDURE — 99213 OFFICE O/P EST LOW 20 MIN: CPT | Mod: 25

## 2024-12-20 PROCEDURE — 20611 DRAIN/INJ JOINT/BURSA W/US: CPT | Mod: LT

## 2024-12-20 RX ORDER — DICLOFENAC SODIUM 10 MG/G
1 GEL TOPICAL DAILY
Qty: 1 | Refills: 1 | Status: ACTIVE | COMMUNITY
Start: 2024-12-20 | End: 1900-01-01

## 2025-01-03 ENCOUNTER — APPOINTMENT (OUTPATIENT)
Dept: CARDIOLOGY | Facility: CLINIC | Age: 55
End: 2025-01-03
Payer: COMMERCIAL

## 2025-01-03 ENCOUNTER — NON-APPOINTMENT (OUTPATIENT)
Age: 55
End: 2025-01-03

## 2025-01-03 VITALS
DIASTOLIC BLOOD PRESSURE: 80 MMHG | OXYGEN SATURATION: 96 % | WEIGHT: 210 LBS | SYSTOLIC BLOOD PRESSURE: 122 MMHG | HEART RATE: 57 BPM | HEIGHT: 72 IN | BODY MASS INDEX: 28.44 KG/M2

## 2025-01-03 DIAGNOSIS — I49.3 VENTRICULAR PREMATURE DEPOLARIZATION: ICD-10-CM

## 2025-01-03 DIAGNOSIS — I10 ESSENTIAL (PRIMARY) HYPERTENSION: ICD-10-CM

## 2025-01-03 LAB — HBA1C MFR BLD HPLC: 5.7

## 2025-01-03 PROCEDURE — 99213 OFFICE O/P EST LOW 20 MIN: CPT

## 2025-01-03 RX ORDER — OMEGA-3/DHA/EPA/FISH OIL 300-1000MG
CAPSULE ORAL
Refills: 0 | Status: ACTIVE | COMMUNITY

## 2025-01-03 RX ORDER — DICLOFENAC SODIUM 1 %
KIT TOPICAL DAILY
Refills: 0 | Status: ACTIVE | COMMUNITY

## 2025-01-06 ENCOUNTER — APPOINTMENT (OUTPATIENT)
Dept: ORTHOPEDIC SURGERY | Facility: CLINIC | Age: 55
End: 2025-01-06

## 2025-01-24 ENCOUNTER — APPOINTMENT (OUTPATIENT)
Dept: ORTHOPEDIC SURGERY | Facility: CLINIC | Age: 55
End: 2025-01-24
Payer: COMMERCIAL

## 2025-01-24 DIAGNOSIS — M75.22 BICIPITAL TENDINITIS, LEFT SHOULDER: ICD-10-CM

## 2025-01-24 DIAGNOSIS — M75.42 IMPINGEMENT SYNDROME OF LEFT SHOULDER: ICD-10-CM

## 2025-01-24 DIAGNOSIS — M75.52 BURSITIS OF LEFT SHOULDER: ICD-10-CM

## 2025-01-24 PROCEDURE — 99214 OFFICE O/P EST MOD 30 MIN: CPT

## 2025-02-03 ENCOUNTER — NON-APPOINTMENT (OUTPATIENT)
Age: 55
End: 2025-02-03

## 2025-02-03 ENCOUNTER — APPOINTMENT (OUTPATIENT)
Dept: CARDIOLOGY | Facility: CLINIC | Age: 55
End: 2025-02-03
Payer: COMMERCIAL

## 2025-02-03 VITALS
DIASTOLIC BLOOD PRESSURE: 72 MMHG | HEART RATE: 60 BPM | HEIGHT: 72 IN | RESPIRATION RATE: 14 BRPM | WEIGHT: 215 LBS | SYSTOLIC BLOOD PRESSURE: 120 MMHG | OXYGEN SATURATION: 98 % | BODY MASS INDEX: 29.12 KG/M2

## 2025-02-03 DIAGNOSIS — G47.33 OBSTRUCTIVE SLEEP APNEA (ADULT) (PEDIATRIC): ICD-10-CM

## 2025-02-03 DIAGNOSIS — I10 ESSENTIAL (PRIMARY) HYPERTENSION: ICD-10-CM

## 2025-02-03 DIAGNOSIS — Z86.39 PERSONAL HISTORY OF OTHER ENDOCRINE, NUTRITIONAL AND METABOLIC DISEASE: ICD-10-CM

## 2025-02-03 DIAGNOSIS — E26.09 OTHER PRIMARY HYPERALDOSTERONISM: ICD-10-CM

## 2025-02-03 DIAGNOSIS — I49.3 VENTRICULAR PREMATURE DEPOLARIZATION: ICD-10-CM

## 2025-02-03 PROCEDURE — 99215 OFFICE O/P EST HI 40 MIN: CPT

## 2025-02-03 PROCEDURE — 93000 ELECTROCARDIOGRAM COMPLETE: CPT

## 2025-02-10 ENCOUNTER — APPOINTMENT (OUTPATIENT)
Dept: ORTHOPEDIC SURGERY | Facility: CLINIC | Age: 55
End: 2025-02-10

## 2025-02-10 DIAGNOSIS — M75.22 BICIPITAL TENDINITIS, LEFT SHOULDER: ICD-10-CM

## 2025-02-10 DIAGNOSIS — M75.52 BURSITIS OF LEFT SHOULDER: ICD-10-CM

## 2025-02-10 DIAGNOSIS — M75.42 IMPINGEMENT SYNDROME OF LEFT SHOULDER: ICD-10-CM

## 2025-02-10 PROCEDURE — 99214 OFFICE O/P EST MOD 30 MIN: CPT

## 2025-02-10 PROCEDURE — L3960: CPT | Mod: LT

## 2025-02-10 RX ORDER — OXYCODONE 5 MG/1
5 TABLET ORAL
Qty: 20 | Refills: 0 | Status: ACTIVE | COMMUNITY
Start: 2025-02-10 | End: 1900-01-01

## 2025-02-24 ENCOUNTER — APPOINTMENT (OUTPATIENT)
Dept: CARDIOLOGY | Facility: CLINIC | Age: 55
End: 2025-02-24
Payer: COMMERCIAL

## 2025-02-24 PROCEDURE — 93306 TTE W/DOPPLER COMPLETE: CPT

## 2025-02-26 ENCOUNTER — NON-APPOINTMENT (OUTPATIENT)
Age: 55
End: 2025-02-26

## 2025-02-27 ENCOUNTER — APPOINTMENT (OUTPATIENT)
Dept: ORTHOPEDIC SURGERY | Facility: HOSPITAL | Age: 55
End: 2025-02-27
Payer: COMMERCIAL

## 2025-02-27 PROCEDURE — 23430 REPAIR BICEPS TENDON: CPT | Mod: LT

## 2025-02-27 PROCEDURE — 29826 SHO ARTHRS SRG DECOMPRESSION: CPT | Mod: LT

## 2025-02-27 PROCEDURE — 29821 SHO ARTHRS SRG COMPL SYNVCT: CPT | Mod: 59,LT

## 2025-02-27 PROCEDURE — 29821 SHO ARTHRS SRG COMPL SYNVCT: CPT | Mod: AS,59,LT

## 2025-02-27 PROCEDURE — 23430 REPAIR BICEPS TENDON: CPT | Mod: AS,LT

## 2025-02-27 PROCEDURE — 29826 SHO ARTHRS SRG DECOMPRESSION: CPT | Mod: AS,LT

## 2025-02-27 PROCEDURE — 29823 SHO ARTHRS SRG XTNSV DBRDMT: CPT | Mod: AS,59,LT

## 2025-02-27 PROCEDURE — 29823 SHO ARTHRS SRG XTNSV DBRDMT: CPT | Mod: 59,LT

## 2025-03-07 ENCOUNTER — APPOINTMENT (OUTPATIENT)
Dept: ORTHOPEDIC SURGERY | Facility: CLINIC | Age: 55
End: 2025-03-07
Payer: COMMERCIAL

## 2025-03-07 PROCEDURE — 99024 POSTOP FOLLOW-UP VISIT: CPT

## 2025-03-10 ENCOUNTER — APPOINTMENT (OUTPATIENT)
Dept: ORTHOPEDIC SURGERY | Facility: CLINIC | Age: 55
End: 2025-03-10
Payer: COMMERCIAL

## 2025-03-10 DIAGNOSIS — Z48.89 ENCOUNTER FOR OTHER SPECIFIED SURGICAL AFTERCARE: ICD-10-CM

## 2025-03-10 DIAGNOSIS — Z51.89 ENCOUNTER FOR OTHER SPECIFIED AFTERCARE: ICD-10-CM

## 2025-03-10 DIAGNOSIS — M75.22 BICIPITAL TENDINITIS, LEFT SHOULDER: ICD-10-CM

## 2025-03-10 DIAGNOSIS — M75.42 IMPINGEMENT SYNDROME OF LEFT SHOULDER: ICD-10-CM

## 2025-03-10 DIAGNOSIS — M75.52 BURSITIS OF LEFT SHOULDER: ICD-10-CM

## 2025-03-10 PROCEDURE — 99024 POSTOP FOLLOW-UP VISIT: CPT

## 2025-03-11 ENCOUNTER — NON-APPOINTMENT (OUTPATIENT)
Age: 55
End: 2025-03-11

## 2025-03-12 ENCOUNTER — TRANSCRIPTION ENCOUNTER (OUTPATIENT)
Age: 55
End: 2025-03-12

## 2025-04-11 ENCOUNTER — APPOINTMENT (OUTPATIENT)
Dept: ORTHOPEDIC SURGERY | Facility: CLINIC | Age: 55
End: 2025-04-11
Payer: COMMERCIAL

## 2025-04-11 DIAGNOSIS — M75.42 IMPINGEMENT SYNDROME OF LEFT SHOULDER: ICD-10-CM

## 2025-04-11 DIAGNOSIS — M75.52 BURSITIS OF LEFT SHOULDER: ICD-10-CM

## 2025-04-11 DIAGNOSIS — M75.22 BICIPITAL TENDINITIS, LEFT SHOULDER: ICD-10-CM

## 2025-04-11 PROCEDURE — 99024 POSTOP FOLLOW-UP VISIT: CPT

## 2025-08-01 ENCOUNTER — APPOINTMENT (OUTPATIENT)
Dept: CARDIOLOGY | Facility: CLINIC | Age: 55
End: 2025-08-01

## 2025-08-01 VITALS
HEIGHT: 72 IN | RESPIRATION RATE: 14 BRPM | HEART RATE: 59 BPM | WEIGHT: 215 LBS | BODY MASS INDEX: 29.12 KG/M2 | OXYGEN SATURATION: 99 % | DIASTOLIC BLOOD PRESSURE: 74 MMHG | SYSTOLIC BLOOD PRESSURE: 124 MMHG

## 2025-08-01 DIAGNOSIS — I49.3 VENTRICULAR PREMATURE DEPOLARIZATION: ICD-10-CM

## 2025-08-01 DIAGNOSIS — I10 ESSENTIAL (PRIMARY) HYPERTENSION: ICD-10-CM

## 2025-08-01 DIAGNOSIS — Z86.39 PERSONAL HISTORY OF OTHER ENDOCRINE, NUTRITIONAL AND METABOLIC DISEASE: ICD-10-CM

## 2025-08-01 DIAGNOSIS — E26.09 OTHER PRIMARY HYPERALDOSTERONISM: ICD-10-CM

## 2025-08-01 DIAGNOSIS — G47.33 OBSTRUCTIVE SLEEP APNEA (ADULT) (PEDIATRIC): ICD-10-CM

## 2025-08-01 PROCEDURE — 99214 OFFICE O/P EST MOD 30 MIN: CPT

## 2025-08-01 PROCEDURE — G2211 COMPLEX E/M VISIT ADD ON: CPT | Mod: NC

## 2025-09-06 LAB — HBA1C MFR BLD HPLC: 5.8
